# Patient Record
Sex: MALE | Race: WHITE | NOT HISPANIC OR LATINO | URBAN - METROPOLITAN AREA
[De-identification: names, ages, dates, MRNs, and addresses within clinical notes are randomized per-mention and may not be internally consistent; named-entity substitution may affect disease eponyms.]

---

## 2018-03-29 ENCOUNTER — INPATIENT (INPATIENT)
Facility: HOSPITAL | Age: 43
LOS: 2 days | Discharge: HOME | DRG: 444 | End: 2018-04-01
Attending: INTERNAL MEDICINE | Admitting: INTERNAL MEDICINE
Payer: MEDICAID

## 2018-03-29 VITALS
WEIGHT: 199.96 LBS | TEMPERATURE: 99 F | RESPIRATION RATE: 16 BRPM | OXYGEN SATURATION: 99 % | DIASTOLIC BLOOD PRESSURE: 91 MMHG | HEART RATE: 108 BPM | HEIGHT: 71 IN | SYSTOLIC BLOOD PRESSURE: 131 MMHG

## 2018-03-29 DIAGNOSIS — R10.9 UNSPECIFIED ABDOMINAL PAIN: ICD-10-CM

## 2018-03-29 DIAGNOSIS — K85.90 ACUTE PANCREATITIS WITHOUT NECROSIS OR INFECTION, UNSPECIFIED: ICD-10-CM

## 2018-03-29 DIAGNOSIS — K80.50 CALCULUS OF BILE DUCT WITHOUT CHOLANGITIS OR CHOLECYSTITIS WITHOUT OBSTRUCTION: ICD-10-CM

## 2018-03-29 DIAGNOSIS — Z29.9 ENCOUNTER FOR PROPHYLACTIC MEASURES, UNSPECIFIED: ICD-10-CM

## 2018-03-29 DIAGNOSIS — R11.2 NAUSEA WITH VOMITING, UNSPECIFIED: ICD-10-CM

## 2018-03-29 LAB
ALBUMIN SERPL ELPH-MCNC: 4 G/DL — SIGNIFICANT CHANGE UP (ref 3.3–5)
ALP SERPL-CCNC: 335 U/L — HIGH (ref 40–120)
ALT FLD-CCNC: 558 U/L — HIGH (ref 12–78)
AMYLASE P1 CFR SERPL: >1300 U/L — HIGH (ref 25–115)
ANION GAP SERPL CALC-SCNC: 11 MMOL/L — SIGNIFICANT CHANGE UP (ref 5–17)
APPEARANCE UR: CLEAR — SIGNIFICANT CHANGE UP
APTT BLD: 33.6 SEC — SIGNIFICANT CHANGE UP (ref 27.5–37.4)
AST SERPL-CCNC: 363 U/L — HIGH (ref 15–37)
BASOPHILS # BLD AUTO: 0.1 K/UL — SIGNIFICANT CHANGE UP (ref 0–0.2)
BASOPHILS NFR BLD AUTO: 0.8 % — SIGNIFICANT CHANGE UP (ref 0–2)
BILIRUB SERPL-MCNC: 9.1 MG/DL — HIGH (ref 0.2–1.2)
BILIRUB UR-MCNC: ABNORMAL
BUN SERPL-MCNC: 10 MG/DL — SIGNIFICANT CHANGE UP (ref 7–23)
CALCIUM SERPL-MCNC: 9.6 MG/DL — SIGNIFICANT CHANGE UP (ref 8.5–10.1)
CHLORIDE SERPL-SCNC: 97 MMOL/L — SIGNIFICANT CHANGE UP (ref 96–108)
CO2 SERPL-SCNC: 24 MMOL/L — SIGNIFICANT CHANGE UP (ref 22–31)
COLOR SPEC: ABNORMAL
CREAT SERPL-MCNC: 1.4 MG/DL — HIGH (ref 0.5–1.3)
DIFF PNL FLD: ABNORMAL
EOSINOPHIL # BLD AUTO: 0 K/UL — SIGNIFICANT CHANGE UP (ref 0–0.5)
EOSINOPHIL NFR BLD AUTO: 0.3 % — SIGNIFICANT CHANGE UP (ref 0–6)
GLUCOSE SERPL-MCNC: 175 MG/DL — HIGH (ref 70–99)
GLUCOSE UR QL: NEGATIVE — SIGNIFICANT CHANGE UP
HCT VFR BLD CALC: 50.3 % — HIGH (ref 39–50)
HGB BLD-MCNC: 16.1 G/DL — SIGNIFICANT CHANGE UP (ref 13–17)
INR BLD: 1.04 RATIO — SIGNIFICANT CHANGE UP (ref 0.88–1.16)
KETONES UR-MCNC: ABNORMAL
LACTATE SERPL-SCNC: 1.8 MMOL/L — SIGNIFICANT CHANGE UP (ref 0.7–2)
LEUKOCYTE ESTERASE UR-ACNC: ABNORMAL
LIDOCAIN IGE QN: SIGNIFICANT CHANGE UP U/L (ref 73–393)
LYMPHOCYTES # BLD AUTO: 0.8 K/UL — LOW (ref 1–3.3)
LYMPHOCYTES # BLD AUTO: 5 % — LOW (ref 13–44)
MCHC RBC-ENTMCNC: 29.2 PG — SIGNIFICANT CHANGE UP (ref 27–34)
MCHC RBC-ENTMCNC: 32 GM/DL — SIGNIFICANT CHANGE UP (ref 32–36)
MCV RBC AUTO: 91.3 FL — SIGNIFICANT CHANGE UP (ref 80–100)
MONOCYTES # BLD AUTO: 1.2 K/UL — HIGH (ref 0–0.9)
MONOCYTES NFR BLD AUTO: 6.9 % — SIGNIFICANT CHANGE UP (ref 1–9)
NEUTROPHILS # BLD AUTO: 14.7 K/UL — HIGH (ref 1.8–7.4)
NEUTROPHILS NFR BLD AUTO: 87 % — HIGH (ref 43–77)
NITRITE UR-MCNC: POSITIVE
PH UR: 5 — SIGNIFICANT CHANGE UP (ref 5–8)
PLATELET # BLD AUTO: 342 K/UL — SIGNIFICANT CHANGE UP (ref 150–400)
POTASSIUM SERPL-MCNC: 4.5 MMOL/L — SIGNIFICANT CHANGE UP (ref 3.5–5.3)
POTASSIUM SERPL-SCNC: 4.5 MMOL/L — SIGNIFICANT CHANGE UP (ref 3.5–5.3)
PROT SERPL-MCNC: 8.6 G/DL — HIGH (ref 6–8.3)
PROT UR-MCNC: 75 MG/DL
PROTHROM AB SERPL-ACNC: 11.4 SEC — SIGNIFICANT CHANGE UP (ref 9.8–12.7)
RBC # BLD: 5.51 M/UL — SIGNIFICANT CHANGE UP (ref 4.2–5.8)
RBC # FLD: 13.1 % — SIGNIFICANT CHANGE UP (ref 10.3–14.5)
SODIUM SERPL-SCNC: 132 MMOL/L — LOW (ref 135–145)
SP GR SPEC: 1.02 — SIGNIFICANT CHANGE UP (ref 1.01–1.02)
UROBILINOGEN FLD QL: 8
WBC # BLD: 16.9 K/UL — HIGH (ref 3.8–10.5)
WBC # FLD AUTO: 16.9 K/UL — HIGH (ref 3.8–10.5)

## 2018-03-29 PROCEDURE — 76705 ECHO EXAM OF ABDOMEN: CPT | Mod: 26

## 2018-03-29 PROCEDURE — 99223 1ST HOSP IP/OBS HIGH 75: CPT | Mod: GC

## 2018-03-29 PROCEDURE — 99285 EMERGENCY DEPT VISIT HI MDM: CPT

## 2018-03-29 PROCEDURE — 74177 CT ABD & PELVIS W/CONTRAST: CPT | Mod: 26

## 2018-03-29 RX ORDER — SODIUM CHLORIDE 9 MG/ML
1000 INJECTION INTRAMUSCULAR; INTRAVENOUS; SUBCUTANEOUS ONCE
Qty: 0 | Refills: 0 | Status: COMPLETED | OUTPATIENT
Start: 2018-03-29 | End: 2018-03-29

## 2018-03-29 RX ORDER — HYDROMORPHONE HYDROCHLORIDE 2 MG/ML
1 INJECTION INTRAMUSCULAR; INTRAVENOUS; SUBCUTANEOUS ONCE
Qty: 0 | Refills: 0 | Status: DISCONTINUED | OUTPATIENT
Start: 2018-03-29 | End: 2018-03-29

## 2018-03-29 RX ORDER — MORPHINE SULFATE 50 MG/1
4 CAPSULE, EXTENDED RELEASE ORAL ONCE
Qty: 0 | Refills: 0 | Status: DISCONTINUED | OUTPATIENT
Start: 2018-03-29 | End: 2018-03-29

## 2018-03-29 RX ORDER — SODIUM CHLORIDE 9 MG/ML
1000 INJECTION, SOLUTION INTRAVENOUS
Qty: 0 | Refills: 0 | Status: DISCONTINUED | OUTPATIENT
Start: 2018-03-29 | End: 2018-03-29

## 2018-03-29 RX ORDER — MORPHINE SULFATE 50 MG/1
4 CAPSULE, EXTENDED RELEASE ORAL EVERY 4 HOURS
Qty: 0 | Refills: 0 | Status: DISCONTINUED | OUTPATIENT
Start: 2018-03-29 | End: 2018-04-01

## 2018-03-29 RX ORDER — SODIUM CHLORIDE 9 MG/ML
1000 INJECTION INTRAMUSCULAR; INTRAVENOUS; SUBCUTANEOUS
Qty: 0 | Refills: 0 | Status: DISCONTINUED | OUTPATIENT
Start: 2018-03-29 | End: 2018-03-29

## 2018-03-29 RX ORDER — MORPHINE SULFATE 50 MG/1
2 CAPSULE, EXTENDED RELEASE ORAL EVERY 4 HOURS
Qty: 0 | Refills: 0 | Status: DISCONTINUED | OUTPATIENT
Start: 2018-03-29 | End: 2018-04-01

## 2018-03-29 RX ORDER — ONDANSETRON 8 MG/1
4 TABLET, FILM COATED ORAL EVERY 6 HOURS
Qty: 0 | Refills: 0 | Status: DISCONTINUED | OUTPATIENT
Start: 2018-03-29 | End: 2018-04-01

## 2018-03-29 RX ORDER — IOHEXOL 300 MG/ML
30 INJECTION, SOLUTION INTRAVENOUS ONCE
Qty: 0 | Refills: 0 | Status: COMPLETED | OUTPATIENT
Start: 2018-03-29 | End: 2018-03-29

## 2018-03-29 RX ORDER — ONDANSETRON 8 MG/1
4 TABLET, FILM COATED ORAL EVERY 6 HOURS
Qty: 0 | Refills: 0 | Status: DISCONTINUED | OUTPATIENT
Start: 2018-03-29 | End: 2018-03-29

## 2018-03-29 RX ORDER — HYDROMORPHONE HYDROCHLORIDE 2 MG/ML
1 INJECTION INTRAMUSCULAR; INTRAVENOUS; SUBCUTANEOUS EVERY 6 HOURS
Qty: 0 | Refills: 0 | Status: DISCONTINUED | OUTPATIENT
Start: 2018-03-29 | End: 2018-04-01

## 2018-03-29 RX ORDER — ACETAMINOPHEN 500 MG
1000 TABLET ORAL ONCE
Qty: 0 | Refills: 0 | Status: DISCONTINUED | OUTPATIENT
Start: 2018-03-29 | End: 2018-03-29

## 2018-03-29 RX ORDER — SODIUM CHLORIDE 9 MG/ML
1000 INJECTION, SOLUTION INTRAVENOUS
Qty: 0 | Refills: 0 | Status: DISCONTINUED | OUTPATIENT
Start: 2018-03-29 | End: 2018-03-30

## 2018-03-29 RX ORDER — PANTOPRAZOLE SODIUM 20 MG/1
40 TABLET, DELAYED RELEASE ORAL
Qty: 0 | Refills: 0 | Status: DISCONTINUED | OUTPATIENT
Start: 2018-03-29 | End: 2018-04-01

## 2018-03-29 RX ORDER — HYDROMORPHONE HYDROCHLORIDE 2 MG/ML
0.5 INJECTION INTRAMUSCULAR; INTRAVENOUS; SUBCUTANEOUS
Qty: 0 | Refills: 0 | Status: DISCONTINUED | OUTPATIENT
Start: 2018-03-29 | End: 2018-03-29

## 2018-03-29 RX ORDER — ONDANSETRON 8 MG/1
4 TABLET, FILM COATED ORAL ONCE
Qty: 0 | Refills: 0 | Status: COMPLETED | OUTPATIENT
Start: 2018-03-29 | End: 2018-03-29

## 2018-03-29 RX ORDER — PIPERACILLIN AND TAZOBACTAM 4; .5 G/20ML; G/20ML
3.38 INJECTION, POWDER, LYOPHILIZED, FOR SOLUTION INTRAVENOUS ONCE
Qty: 0 | Refills: 0 | Status: COMPLETED | OUTPATIENT
Start: 2018-03-29 | End: 2018-03-29

## 2018-03-29 RX ORDER — PIPERACILLIN AND TAZOBACTAM 4; .5 G/20ML; G/20ML
3.38 INJECTION, POWDER, LYOPHILIZED, FOR SOLUTION INTRAVENOUS EVERY 8 HOURS
Qty: 0 | Refills: 0 | Status: DISCONTINUED | OUTPATIENT
Start: 2018-03-29 | End: 2018-03-31

## 2018-03-29 RX ADMIN — ONDANSETRON 4 MILLIGRAM(S): 8 TABLET, FILM COATED ORAL at 13:14

## 2018-03-29 RX ADMIN — MORPHINE SULFATE 4 MILLIGRAM(S): 50 CAPSULE, EXTENDED RELEASE ORAL at 15:18

## 2018-03-29 RX ADMIN — SODIUM CHLORIDE 1000 MILLILITER(S): 9 INJECTION INTRAMUSCULAR; INTRAVENOUS; SUBCUTANEOUS at 13:17

## 2018-03-29 RX ADMIN — MORPHINE SULFATE 4 MILLIGRAM(S): 50 CAPSULE, EXTENDED RELEASE ORAL at 14:40

## 2018-03-29 RX ADMIN — PIPERACILLIN AND TAZOBACTAM 25 GRAM(S): 4; .5 INJECTION, POWDER, LYOPHILIZED, FOR SOLUTION INTRAVENOUS at 00:16

## 2018-03-29 RX ADMIN — SODIUM CHLORIDE 1000 MILLILITER(S): 9 INJECTION INTRAMUSCULAR; INTRAVENOUS; SUBCUTANEOUS at 14:55

## 2018-03-29 RX ADMIN — MORPHINE SULFATE 4 MILLIGRAM(S): 50 CAPSULE, EXTENDED RELEASE ORAL at 15:00

## 2018-03-29 RX ADMIN — MORPHINE SULFATE 4 MILLIGRAM(S): 50 CAPSULE, EXTENDED RELEASE ORAL at 18:45

## 2018-03-29 RX ADMIN — PANTOPRAZOLE SODIUM 40 MILLIGRAM(S): 20 TABLET, DELAYED RELEASE ORAL at 22:22

## 2018-03-29 RX ADMIN — PIPERACILLIN AND TAZOBACTAM 25 GRAM(S): 4; .5 INJECTION, POWDER, LYOPHILIZED, FOR SOLUTION INTRAVENOUS at 22:40

## 2018-03-29 RX ADMIN — SODIUM CHLORIDE 200 MILLILITER(S): 9 INJECTION, SOLUTION INTRAVENOUS at 22:15

## 2018-03-29 RX ADMIN — IOHEXOL 30 MILLILITER(S): 300 INJECTION, SOLUTION INTRAVENOUS at 13:15

## 2018-03-29 RX ADMIN — MORPHINE SULFATE 4 MILLIGRAM(S): 50 CAPSULE, EXTENDED RELEASE ORAL at 13:15

## 2018-03-29 RX ADMIN — PIPERACILLIN AND TAZOBACTAM 200 GRAM(S): 4; .5 INJECTION, POWDER, LYOPHILIZED, FOR SOLUTION INTRAVENOUS at 14:54

## 2018-03-29 RX ADMIN — SODIUM CHLORIDE 150 MILLILITER(S): 9 INJECTION INTRAMUSCULAR; INTRAVENOUS; SUBCUTANEOUS at 16:00

## 2018-03-29 NOTE — H&P ADULT - ASSESSMENT
42M with PMH GERD presents with 1 week of Left flank pain and intractable nausea, vomiting, admitted for biliary pancreatitis.

## 2018-03-29 NOTE — ED PROVIDER NOTE - PROGRESS NOTE DETAILS
call out to Dr Disla, awaiting call back,  call out to hospitalist,  Dr Whiting will call me back spoke with Dr Whiting, case discussed, will admit patient, spoke with Dr Disla, case discussed, will see patient, most likely ERCP today

## 2018-03-29 NOTE — ED PROVIDER NOTE - ATTENDING CONTRIBUTION TO CARE
I have personally performed a face to face diagnostic evaluation on this patient.  I have reviewed the PA note and agree with the history, exam, and plan of care, except as noted.  History and Exam by me shows  right left flank pain intermittently x 1 week, now exacerbated on the right flank.   no n/v or fever.  abd- soft, nt, no guarding or rebound.  no cva tenderness.  pt is jaundice . lab- elevated lft and bilitrubin. us shows choledocholithiasis .  Admit for ERCP

## 2018-03-29 NOTE — CONSULT NOTE ADULT - SUBJECTIVE AND OBJECTIVE BOX
Chief Complaint:  Patient is a 42y old  Male who presents with a chief complaint of     · HPI Objective Statement: 42 y male with a PMH significant for GERD presents with  left flank to mid back pain x 1 week, intermittent episodes of nausea with vomiting generalized abdominal pain.  denies change in bowel and bladder habits,  has noted his urine is dark, not sure if he had fever, decreased appetite.  last meal was last night.  non smoker, no etoh,  No PMD no hx of abdominal surgeries.	      Allergies:  No Known Allergies      Medications:      PMHX/PSHX:  No pertinent past medical history  No significant past surgical history      Family history:  nc    Social History:     ROS:     General:  No wt loss, fevers, chills, night sweats, fatigue,   Eyes:  Good vision, no reported pain  ENT:  No sore throat, pain, runny nose, dysphagia  CV:  No pain, palpitations, hypo/hypertension  Resp:  No dyspnea, cough, tachypnea, wheezing  GI:  + nausea, + vomiting, No pain, No nausea, No vomiting, No diarrhea, No constipation, No weight loss, No fever, No pruritis, No rectal bleeding, No tarry stools, No dysphagia,  :  + L flank pain, No pain, bleeding, incontinence, nocturia  Muscle:  No pain, weakness  Neuro:  No weakness, tingling, memory problems  Psych:  No fatigue, insomnia, mood problems, depression  Endocrine:  No polyuria, polydipsia, cold/heat intolerance  Heme:  No petechiae, ecchymosis, easy bruisability  Skin:  No rash, tattoos, scars, edema      PHYSICAL EXAM:   Vital Signs:  Vital Signs Last 24 Hrs  T(C): 36.8 (29 Mar 2018 13:20), Max: 37 (29 Mar 2018 12:36)  T(F): 98.2 (29 Mar 2018 13:20), Max: 98.6 (29 Mar 2018 12:36)  HR: 86 (29 Mar 2018 13:20) (86 - 108)  BP: 123/78 (29 Mar 2018 13:20) (123/78 - 131/91)  BP(mean): --  RR: 15 (29 Mar 2018 13:20) (15 - 16)  SpO2: 99% (29 Mar 2018 13:20) (99% - 99%)  Daily Height in cm: 180.34 (29 Mar 2018 12:36)    Daily     GENERAL:  Appears stated age, well-groomed, well-nourished, no distress  HEENT:  NC/AT,  conjunctivae clear and pink, no thyromegaly, nodules, adenopathy, no JVD, sclera -anicteric  CHEST:  Full & symmetric excursion, no increased effort, breath sounds clear  HEART:  Regular rhythm, S1, S2, no murmur/rub/S3/S4, no abdominal bruit, no edema  ABDOMEN:  Soft, non-tender, non-distended, normoactive bowel sounds,  no masses ,no hepato-splenomegaly, no signs of chronic liver disease  EXTREMITIES:  no cyanosis,clubbing or edema  SKIN:  + jaundice   NEURO:  Alert, oriented, no asterixis, no tremor, no encephalopathy    LABS:                        16.1   16.9  )-----------( 342      ( 29 Mar 2018 13:15 )             50.3         132<L>  |  97  |  10  ----------------------------<  175<H>  4.5   |  24  |  1.40<H>    Ca    9.6      29 Mar 2018 13:15    TPro  8.6<H>  /  Alb  4.0  /  TBili  9.1<H>  /  DBili  x   /  AST  363<H>  /  ALT  558<H>  /  AlkPhos  335<H>      LIVER FUNCTIONS - ( 29 Mar 2018 13:15 )  Alb: 4.0 g/dL / Pro: 8.6 g/dL / ALK PHOS: 335 U/L / ALT: 558 U/L / AST: 363 U/L / GGT: x           PT/INR - ( 29 Mar 2018 13:15 )   PT: 11.4 sec;   INR: 1.04 ratio         PTT - ( 29 Mar 2018 13:15 )  PTT:33.6 sec  Urinalysis Basic - ( 29 Mar 2018 13:15 )    Color: Josee / Appearance: Clear / S.020 / pH: x  Gluc: x / Ketone: Moderate  / Bili: Large / Urobili: 8   Blood: x / Protein: 75 mg/dL / Nitrite: Positive   Leuk Esterase: Small / RBC: 0-2 /HPF / WBC 0-2   Sq Epi: x / Non Sq Epi: x / Bacteria: Few      Amylase Serum>1300      Lipase [id number]       Ammonia--      Imaging:

## 2018-03-29 NOTE — CONSULT NOTE ADULT - ASSESSMENT
42 year old male presents with nausea and vomiting, L sided back pain, US gallbladder with possible choledocholithiasis

## 2018-03-29 NOTE — CONSULT NOTE ADULT - PROBLEM SELECTOR RECOMMENDATION 9
US gallbladder with likely choledocholithiasis  Bili 9.1, alk phos 335  pain control  start Zosyn  Plan for ERCP today, keep NPO

## 2018-03-29 NOTE — H&P ADULT - NSHPREVIEWOFSYSTEMS_GEN_ALL_CORE
Constitutional: (+) fever, chills, diaphoresis   HEENT: denies blurry vision, difficulty hearing  Respiratory: denies SOB, RASMUSSEN, cough, sputum production, wheezing, hemoptysis  Cardiovascular: denies CP, palpitations, edema  Gastrointestinal: (+) nausea, vomiting, abdominal pain; (-) diarrhea, constipation, melena, hematochezia   Genitourinary: denies dysuria, frequency, urgency, hematuria   Skin/Breast: (+) jaundice, denies itching  Musculoskeletal: denies myalgias, joint swelling, muscle weakness  Neurologic: denies headache, weakness, dizziness, paresthesias, numbness/tingling  Psychiatric: denies feeling anxious, depressed, suicidal, homicidal thoughts  Hematology/Oncology: denies bruising, tender or enlarged lymph nodes   ROS negative except as noted above

## 2018-03-29 NOTE — ED ADULT NURSE NOTE - OBJECTIVE STATEMENT
c/o rt flank pain/nausea x 6 days. c/o left flank pain radiating to rt /nausea x 6 days. pt face/eyes jaundice

## 2018-03-29 NOTE — H&P ADULT - NSHPPHYSICALEXAM_GEN_ALL_CORE
Physical Exam:  General: jaundiced, ill-appearing  HEENT: NCAT, EOMI bl, moist mucous membranes, (+) scleral icterus  Neck: Supple, nontender, no mass  Neurology: A&Ox3, nonfocal, CN II-XII grossly intact, sensation intact  Respiratory: CTA B/L, No W/R/R  CV: RRR, +S1/S2, no murmurs, rubs or gallops  Abdominal: Soft, TTP in URQ, ND +BSx4; (-) arita's sign, (-) rebound tenderness  Extremities: No C/C/E, + peripheral pulses  MSK: Normal ROM, no joint erythema or warmth, no joint swelling   Skin: jaundiced, warm, dry, no rash or abnormal lesions

## 2018-03-29 NOTE — H&P ADULT - ATTENDING COMMENTS
Agree with plan and exam as above with the following: Patient with no significant PMH with now presents with gallstone pancreatitis. Patient medically optimized for planned urgent ERCP. Dr. Disla aware. Rest of plan as above.

## 2018-03-29 NOTE — H&P ADULT - PROBLEM SELECTOR PLAN 1
-admit to general medicine floor  -obstructive pancreatitis 2/2 choledocholithiasis confirmed on CT  -plan for ERCP with Dr. Disla today  -zosyn, NPO, IVF, morphine/dilaudid for pain  -f/u GI recs post ERCP -admit to general medicine floor  -obstructive pancreatitis 2/2 choledocholithiasis confirmed on CT  -plan for ERCP with Dr. Disla today  -zosyn, NPO, IVF, morphine/dilaudid for pain  -f/u am labs, lipase, amylase  -f/u GI recs post ERCP  -monitor for resolution of jaundice

## 2018-03-29 NOTE — ED ADULT NURSE REASSESSMENT NOTE - NS ED NURSE REASSESS COMMENT FT1
gave 1/2 dose of 4mg morphine IV pt reported dizzy and relief from pain.   VSS no distress present
report to [place] WAGGON[facility].  Pt will transfer when OR is ready
spoke to Taylor in OR aware drank 1liter contrast at approx 0130 pm.  pt will transfer to floor then to OR

## 2018-03-29 NOTE — ED PROVIDER NOTE - OBJECTIVE STATEMENT
42 y male presents with  left flank to mid back pain x 1 week, intermittent episodes of nausea with vomiting generalized abdominal pain.  denies change in bowel and bladder habits,  has noted his urine is dark, not sure if he had fever, decreased appetite.  last meal was last night.  non smoker, no etoh,  No PMD  no hx of abdominal surgeries.

## 2018-03-29 NOTE — H&P ADULT - HISTORY OF PRESENT ILLNESS
42M with PMH GERD presents with 1 week of Left flank pain.  Two days prior to admission, patient states his urine became the color of caramel and he noticed himself becoming jaundiced.  On the day of admission, patients states the pain became extremely severe, accompanied by intractable nausea, vomiting. 42M with PMH GERD presents with 1 week of Left flank pain.  Two days prior to admission, patient states his urine became the color of caramel and he noticed himself becoming jaundiced.  On the day of admission, patients states the pain became extremely severe, accompanied by intractable nausea, vomiting.  Pain is located in left flank area, radiates towards groin.  Accompanied by URQ pain.  ROS: (+) fevers, chills, nausea, vomiting, abdominal pain, difficulty taking a deep breath 2/2 pain. (-) chest pain/palpitations,     ED Course:  Vitals on presentation: T98.6F oral, , /91, RR 16, SpO2 99% on RA.  Labs significant for WBC 16.9 w/ left shift, Na 132, Cr 1.4, T bili 9.1, Alk Phos 335, , , lipase [id number]. UA: mendez urine, large bili, (+) nitrite, LE, few bacteria.  US GB: Cholelithiasis with mild biliary dilatation suggestive of possible choledocholithiasis. 42M with PMH GERD presents with 1 week of Left flank pain.  Two days prior to admission, patient states his urine became the color of caramel and he noticed himself becoming jaundiced.  On the day of admission, patients states the pain became extremely severe, accompanied by intractable nausea, vomiting.  Pain is located in left flank area, radiates towards groin.  Accompanied by URQ pain.  ROS: (+) fevers, chills, nausea, vomiting, abdominal pain, difficulty taking a deep breath 2/2 pain. (-) chest pain/palpitations,     ED Course:  Vitals on presentation: T98.6F oral, , /91, RR 16, SpO2 99% on RA.  Labs significant for WBC 16.9 w/ left shift, Na 132, Cr 1.4, T bili 9.1, Alk Phos 335, , , lipase [id number]. UA: mendez urine, large bili, (+) nitrite, LE, few bacteria.  US GB: Cholelithiasis with mild biliary dilatation suggestive of possible choledocholithiasis.  CT A/P: CT findings compatible with acute interstitial pancreatitis with hydropic gallbladder and intra and extrahepatic biliary ductal dilatation; findings suggestive of biliary pancreatitis.  Patient received morphine, zofran, zosyn, NS 1L bolus x2.

## 2018-03-29 NOTE — H&P ADULT - PROBLEM SELECTOR PLAN 3
DVT PPx: SCDs in setting of procedure today  IMPROVE VTE Individual Risk Assessment          RISK       Points    [  ] Previous VTE 3  [  ] Thrombophilia  2  [  ] Lower limb paralysis  2        (unable to hold up >15 seconds)    [  ] Current Cancer    2         (within 6 months)  [  ] Immobilization > 24 hrs 1  [  ] ICU/CCU stay > 24 hours   1  [  ] Age > 60  1  IMPROVE VTE Score: 0

## 2018-03-30 DIAGNOSIS — K85.90 ACUTE PANCREATITIS WITHOUT NECROSIS OR INFECTION, UNSPECIFIED: ICD-10-CM

## 2018-03-30 DIAGNOSIS — R74.0 NONSPECIFIC ELEVATION OF LEVELS OF TRANSAMINASE AND LACTIC ACID DEHYDROGENASE [LDH]: ICD-10-CM

## 2018-03-30 LAB
ALBUMIN SERPL ELPH-MCNC: 2.7 G/DL — LOW (ref 3.3–5)
ALP SERPL-CCNC: 287 U/L — HIGH (ref 40–120)
ALT FLD-CCNC: 460 U/L — HIGH (ref 12–78)
AMYLASE P1 CFR SERPL: 1162 U/L — HIGH (ref 25–115)
ANION GAP SERPL CALC-SCNC: 9 MMOL/L — SIGNIFICANT CHANGE UP (ref 5–17)
AST SERPL-CCNC: 329 U/L — HIGH (ref 15–37)
BASOPHILS # BLD AUTO: 0.1 K/UL — SIGNIFICANT CHANGE UP (ref 0–0.2)
BASOPHILS NFR BLD AUTO: 0.5 % — SIGNIFICANT CHANGE UP (ref 0–2)
BILIRUB SERPL-MCNC: 5.1 MG/DL — HIGH (ref 0.2–1.2)
BUN SERPL-MCNC: 10 MG/DL — SIGNIFICANT CHANGE UP (ref 7–23)
CALCIUM SERPL-MCNC: 8.2 MG/DL — LOW (ref 8.5–10.1)
CHLORIDE SERPL-SCNC: 101 MMOL/L — SIGNIFICANT CHANGE UP (ref 96–108)
CO2 SERPL-SCNC: 25 MMOL/L — SIGNIFICANT CHANGE UP (ref 22–31)
CREAT SERPL-MCNC: 0.98 MG/DL — SIGNIFICANT CHANGE UP (ref 0.5–1.3)
EOSINOPHIL # BLD AUTO: 0.1 K/UL — SIGNIFICANT CHANGE UP (ref 0–0.5)
EOSINOPHIL NFR BLD AUTO: 0.7 % — SIGNIFICANT CHANGE UP (ref 0–6)
GLUCOSE SERPL-MCNC: 103 MG/DL — HIGH (ref 70–99)
HCT VFR BLD CALC: 41.3 % — SIGNIFICANT CHANGE UP (ref 39–50)
HGB BLD-MCNC: 13.4 G/DL — SIGNIFICANT CHANGE UP (ref 13–17)
LIDOCAIN IGE QN: HIGH U/L (ref 73–393)
LYMPHOCYTES # BLD AUTO: 1 K/UL — SIGNIFICANT CHANGE UP (ref 1–3.3)
LYMPHOCYTES # BLD AUTO: 8 % — LOW (ref 13–44)
MCHC RBC-ENTMCNC: 29.5 PG — SIGNIFICANT CHANGE UP (ref 27–34)
MCHC RBC-ENTMCNC: 32.4 GM/DL — SIGNIFICANT CHANGE UP (ref 32–36)
MCV RBC AUTO: 91 FL — SIGNIFICANT CHANGE UP (ref 80–100)
MONOCYTES # BLD AUTO: 1.2 K/UL — HIGH (ref 0–0.9)
MONOCYTES NFR BLD AUTO: 9.5 % — HIGH (ref 1–9)
NEUTROPHILS # BLD AUTO: 10.4 K/UL — HIGH (ref 1.8–7.4)
NEUTROPHILS NFR BLD AUTO: 81.3 % — HIGH (ref 43–77)
PLATELET # BLD AUTO: 280 K/UL — SIGNIFICANT CHANGE UP (ref 150–400)
POTASSIUM SERPL-MCNC: 3.9 MMOL/L — SIGNIFICANT CHANGE UP (ref 3.5–5.3)
POTASSIUM SERPL-SCNC: 3.9 MMOL/L — SIGNIFICANT CHANGE UP (ref 3.5–5.3)
PROT SERPL-MCNC: 6.2 G/DL — SIGNIFICANT CHANGE UP (ref 6–8.3)
RBC # BLD: 4.54 M/UL — SIGNIFICANT CHANGE UP (ref 4.2–5.8)
RBC # FLD: 13.5 % — SIGNIFICANT CHANGE UP (ref 10.3–14.5)
SODIUM SERPL-SCNC: 135 MMOL/L — SIGNIFICANT CHANGE UP (ref 135–145)
WBC # BLD: 12.8 K/UL — HIGH (ref 3.8–10.5)
WBC # FLD AUTO: 12.8 K/UL — HIGH (ref 3.8–10.5)

## 2018-03-30 PROCEDURE — 99233 SBSQ HOSP IP/OBS HIGH 50: CPT | Mod: GC

## 2018-03-30 RX ORDER — SODIUM CHLORIDE 9 MG/ML
1000 INJECTION, SOLUTION INTRAVENOUS
Qty: 0 | Refills: 0 | Status: DISCONTINUED | OUTPATIENT
Start: 2018-03-30 | End: 2018-04-01

## 2018-03-30 RX ADMIN — PIPERACILLIN AND TAZOBACTAM 25 GRAM(S): 4; .5 INJECTION, POWDER, LYOPHILIZED, FOR SOLUTION INTRAVENOUS at 13:26

## 2018-03-30 RX ADMIN — PANTOPRAZOLE SODIUM 40 MILLIGRAM(S): 20 TABLET, DELAYED RELEASE ORAL at 17:29

## 2018-03-30 RX ADMIN — SODIUM CHLORIDE 150 MILLILITER(S): 9 INJECTION, SOLUTION INTRAVENOUS at 16:31

## 2018-03-30 RX ADMIN — MORPHINE SULFATE 4 MILLIGRAM(S): 50 CAPSULE, EXTENDED RELEASE ORAL at 05:31

## 2018-03-30 RX ADMIN — SODIUM CHLORIDE 200 MILLILITER(S): 9 INJECTION, SOLUTION INTRAVENOUS at 06:14

## 2018-03-30 RX ADMIN — SODIUM CHLORIDE 200 MILLILITER(S): 9 INJECTION, SOLUTION INTRAVENOUS at 13:27

## 2018-03-30 RX ADMIN — PIPERACILLIN AND TAZOBACTAM 25 GRAM(S): 4; .5 INJECTION, POWDER, LYOPHILIZED, FOR SOLUTION INTRAVENOUS at 06:06

## 2018-03-30 RX ADMIN — PIPERACILLIN AND TAZOBACTAM 25 GRAM(S): 4; .5 INJECTION, POWDER, LYOPHILIZED, FOR SOLUTION INTRAVENOUS at 23:23

## 2018-03-30 RX ADMIN — PANTOPRAZOLE SODIUM 40 MILLIGRAM(S): 20 TABLET, DELAYED RELEASE ORAL at 06:06

## 2018-03-30 RX ADMIN — MORPHINE SULFATE 4 MILLIGRAM(S): 50 CAPSULE, EXTENDED RELEASE ORAL at 05:46

## 2018-03-30 NOTE — DISCHARGE NOTE ADULT - HOSPITAL COURSE
42M with PMH GERD presents with 1 week of Left flank pain.  Two days prior to admission, patient states his urine became the color of caramel and he noticed himself becoming jaundiced.  On the day of admission, patients states the pain became extremely severe, accompanied by intractable nausea, vomiting.  Pain is located in left flank area, radiates towards groin.  Accompanied by URQ pain.  ROS: (+) fevers, chills, nausea, vomiting, abdominal pain, difficulty taking a deep breath 2/2 pain. (-) chest pain/palpitations,   Labs significant for WBC 16.9 w/ left shift, Na 132, Cr 1.4, T bili 9.1, Alk Phos 335, , , lipase [id number]. UA: mendez urine, large bili, (+) nitrite, LE, few bacteria.  US GB: Cholelithiasis with mild biliary dilatation suggestive of possible choledocholithiasis.  CT A/P: CT findings compatible with acute interstitial pancreatitis with hydropic gallbladder and intra and extrahepatic biliary ductal dilatation; findings suggestive of biliary pancreatitis.  Patient received morphine, zofran, zosyn, NS 1L bolus x2.      Underwent ERCP with stone removal and stent placement with Dr. Disla- will need removal in about 8 weeks. Evaluated by surgery Dr. Obrien for lap luann- patient and patient's father refused because he is too weak at this time- will follow outpatient. During patient's stay he continued to receive IVF with lactated ringers, and IV abx. Pain control with morphine/dilaudid. Diet was advanced as tolerated by patient. Liver, pancreatic, gallbladder function continued to improve during his stay. WBC trended down. 42M with PMH GERD presents with 1 week of Left flank pain.  Two days prior to admission, patient states his urine became the color of caramel and he noticed himself becoming jaundiced.  On the day of admission, patients states the pain became extremely severe, accompanied by intractable nausea, vomiting.  Pain is located in left flank area, radiates towards groin.  Accompanied by URQ pain.  ROS: (+) fevers, chills, nausea, vomiting, abdominal pain, difficulty taking a deep breath 2/2 pain. (-) chest pain/palpitations,   Labs significant for WBC 16.9 w/ left shift, Na 132, Cr 1.4, T bili 9.1, Alk Phos 335, , , lipase 09920. UA: mendez urine, large bili, (+) nitrite, LE, few bacteria.  US GB: Cholelithiasis with mild biliary dilatation suggestive of possible choledocholithiasis.  CT A/P: CT findings compatible with acute interstitial pancreatitis with hydropic gallbladder and intra and extrahepatic biliary ductal dilatation; findings suggestive of biliary pancreatitis.  Patient received morphine, zofran, zosyn, NS 1L bolus x2.  admitted with acute  Pancreatitis due to biliary obstruction. npo , iv fluid , iv abx   -obstructive pancreatitis 2/2 choledocholithiasis confirmed on CT  -plan for ERCP with gi  Dr. Weber ,       Underwent ERCP with stone removal and stent placement with Dr. Weber  tolerated procedure well - will need removal of stent  in about 8 weeks. Evaluated by surgery Dr. Obrien for lap luann- patient and patient's father refused because he is too weak at this time- will follow outpatient. notice to have  Transaminitis.  -Likely due to obstructing stone in CBD  -s/p ERCP yesterday for CBD stone removal and stent placement  - Alk phos trending down, 287 from 335, LFT's trending down. Will continue to monitor out pt .   - continue to advance diet- currently clear liq pt tolerating, During patient's stay he continued to receive IVF with lactated ringers, and IV abx. Pain control with morphine/dilaudid. Diet was advanced as tolerated by patient low fat diet . dc iv abx as per gi no need , dc iv fluid , blood cult neg , no fever , ucult neg , leucocytosis  better sec to acute pancreatitis  . pt clear by gi and surgery to be dc home  and fu in office . pt received all dc instruction. physical exam 4-1-18  day of dc  Vital Signs Last 24 Hrs  T(C): 36.8 (01 Apr 2018 05:27), Max: 37.3 (31 Mar 2018 14:53)  T(F): 98.2 (01 Apr 2018 05:27), Max: 99.2 (31 Mar 2018 14:53)  HR: 93 (01 Apr 2018 05:27) (89 - 98)  BP: 111/70 (01 Apr 2018 05:27) (111/70 - 123/80)  BP(mean): --  RR: 16 (01 Apr 2018 05:27) (16 - 17)  SpO2: 96% (01 Apr 2018 05:27) (96% - 100%) GEN no distress , HEENT nt/nc , perrla , CVS s1s2 no tachy , CHEST bl air entery  present  , CNS aao/3 , GI soft , bs present  , no tenderness ,  intact , EXT no edema, pedal pulse present , SKIN no rash.  fu gi dr weber in 1to 2 wk . fu dr rios man surgery with in 1wk.

## 2018-03-30 NOTE — DISCHARGE NOTE ADULT - CARE PROVIDER_API CALL
Froylan Disla (), Gastroenterology; Internal Medicine  [address] [place] [address]  Phone: [phone]  Fax: [phone]    Clif Yin), ColonRectal Surgery; Surgery  [address] [place] [address]  Phone: [phone]  Fax: [phone]

## 2018-03-30 NOTE — PROGRESS NOTE ADULT - SUBJECTIVE AND OBJECTIVE BOX
HPI:  42M with PMH GERD presents with 1 week of Left flank pain. Two days prior to admission, patient states his urine became the color of caramel and he noticed himself becoming jaundiced.  On the day of admission, patients states the pain became extremely severe, accompanied by intractable nausea, vomiting.  Pain is located in left flank area, radiates towards groin.  Accompanied by URQ pain.  ROS: (+) fevers, chills, nausea, vomiting, abdominal pain, difficulty taking a deep breath 2/2 pain. (-) chest pain/palpitations,     Labs on presentation were significant for WBC 16.9 w/ left shift, T bili 9.1, Alk Phos 335, , , lipase [id number]. US GB: Cholelithiasis with mild biliary dilatation suggestive of possible choledocholithiasis.  CT A/P: CT findings compatible with acute interstitial pancreatitis with hydropic gallbladder and intra and extrahepatic biliary ductal dilatation; findings suggestive of biliary pancreatitis.     Interval HPI/ Overnight events:  Patient is s/p ERCP yesterday where a CBD stone was removed and a stent was placed. Patient complains of tenderness but is complaining some shortness of breath secondary to abdominal pain. Patient remains NPO and on IV antibiotics.     REVIEW OF SYSTEMS:    CONSTITUTIONAL: No weakness, fevers or chills  EYES/ENT: No visual changes, no throat pain   RESPIRATORY: +pain with inspiration  CARDIOVASCULAR: No chest pain or palpitations  GASTROINTESTINAL: + abdominal tenderness, no nausea, vomiting, or hematemesis; No diarrhea or constipation. No melena or hematochezia.  GENITOURINARY: No dysuria, frequency or hematuria  NEUROLOGICAL: No dizziness, numbness, or weakness  SKIN: No itching, burning, rashes, or lesions   All other review of systems is negative unless indicated above.    VITAL SIGNS:    T: 99.4 HR: 89  BP: 124/81  RR:17  SpO2: 97    PHYSICAL EXAM:     GENERAL: no acute distress  HEENT: + very mild jaunidice, NC/AT, EOMI, neck supple, MMM  RESPIRATORY: LCTAB/L, no rhonchi, rales, or wheezing  CARDIOVASCULAR: RRR, no murmurs, gallops, rubs  ABDOMINAL: +RUQ tenderness to palpation, + mild guarding on RUQ palpation, + distention, positive bowel sounds   EXTREMITIES: no clubbing, cyanosis, or edema  NEUROLOGICAL: alert and oriented x 3, non-focal  SKIN: no rashes or lesions   MUSCULOSKELETAL: no gross joint deformity                          13.4   12.8  )-----------( 280      ( 30 Mar 2018 08:17 )             41.3     03-30    135  |  101  |  10  ----------------------------<  103<H>  3.9   |  25  |  0.98    Ca    8.2<L>      30 Mar 2018 07:04    TPro  6.2  /  Alb  2.7<L>  /  TBili  5.1<H>  /  DBili  x   /  AST  329<H>  /  ALT  460<H>  /  AlkPhos  287<H>  03-30      CAPILLARY BLOOD GLUCOSE          MEDICATIONS  (STANDING):  lactated ringers. 1000 milliLiter(s) (200 mL/Hr) IV Continuous <Continuous>  pantoprazole  Injectable 40 milliGRAM(s) IV Push two times a day  piperacillin/tazobactam IVPB. 3.375 Gram(s) IV Intermittent every 8 hours HPI:  42M with PMH GERD presents with 1 week of Left flank pain. Two days prior to admission, patient states his urine became the color of caramel and he noticed himself becoming jaundiced.  On the day of admission, patients states the pain became extremely severe, accompanied by intractable nausea, vomiting.  Pain is located in left flank area, radiates towards groin.  Accompanied by URQ pain.  ROS: (+) fevers, chills, nausea, vomiting, abdominal pain, difficulty taking a deep breath 2/2 pain. (-) chest pain/palpitations,     Labs on presentation were significant for WBC 16.9 w/ left shift, T bili 9.1, Alk Phos 335, , , lipase [id number]. US GB: Cholelithiasis with mild biliary dilatation suggestive of possible choledocholithiasis.  CT A/P: CT findings compatible with acute interstitial pancreatitis with hydropic gallbladder and intra and extrahepatic biliary ductal dilatation; findings suggestive of biliary pancreatitis.     Interval HPI/ Overnight events:  Patient is s/p ERCP yesterday where a CBD stone was removed and a stent was placed. Patient complains of tenderness but is complaining some shortness of breath secondary to abdominal pain. Diet advanced to clear liquid as per GI today, on IVF and IV antibiotics.     REVIEW OF SYSTEMS:    CONSTITUTIONAL: No weakness, fevers or chills  EYES/ENT: No visual changes, no throat pain   RESPIRATORY: +pain with inspiration  CARDIOVASCULAR: No chest pain or palpitations  GASTROINTESTINAL: + abdominal tenderness, no nausea, vomiting, or hematemesis; No diarrhea or constipation. No melena or hematochezia.  GENITOURINARY: No dysuria, frequency or hematuria  NEUROLOGICAL: No dizziness, numbness, or weakness  SKIN: No itching, burning, rashes, or lesions   All other review of systems is negative unless indicated above.    VITAL SIGNS:    T: 99.4 HR: 89  BP: 124/81  RR:17  SpO2: 97    PHYSICAL EXAM:     GENERAL: no acute distress  HEENT: +mildly icteric, NC/AT, EOMI, neck supple, MMM  RESPIRATORY: LCTAB/L, no rhonchi, rales, or wheezing  CARDIOVASCULAR: RRR, no murmurs, gallops, rubs  ABDOMINAL: +RUQ tenderness to palpation, + mild guarding on RUQ palpation, + distention, positive bowel sounds   EXTREMITIES: no clubbing, cyanosis, or edema  NEUROLOGICAL: alert and oriented x 3, non-focal  SKIN: no rashes or lesions   MUSCULOSKELETAL: no gross joint deformity                          13.4   12.8  )-----------( 280      ( 30 Mar 2018 08:17 )             41.3     03-30    135  |  101  |  10  ----------------------------<  103<H>  3.9   |  25  |  0.98    Ca    8.2<L>      30 Mar 2018 07:04    TPro  6.2  /  Alb  2.7<L>  /  TBili  5.1<H>  /  DBili  x   /  AST  329<H>  /  ALT  460<H>  /  AlkPhos  287<H>  03-30      CAPILLARY BLOOD GLUCOSE          MEDICATIONS  (STANDING):  lactated ringers. 1000 milliLiter(s) (200 mL/Hr) IV Continuous <Continuous>  pantoprazole  Injectable 40 milliGRAM(s) IV Push two times a day  piperacillin/tazobactam IVPB. 3.375 Gram(s) IV Intermittent every 8 hours

## 2018-03-30 NOTE — PROGRESS NOTE ADULT - SUBJECTIVE AND OBJECTIVE BOX
Interval Events: s/p ERCP with stone removal and stent placement. Pt complains of abdominal soreness.    · HPI Objective Statement: 42 y male with a PMH significant for GERD presents with  left flank to mid back pain x 1 week, intermittent episodes of nausea with vomiting generalized abdominal pain.  denies change in bowel and bladder habits,  has noted his urine is dark, not sure if he had fever, decreased appetite.  last meal was last night.  non smoker, no etoh,  No PMD no hx of abdominal surgeries.	      MEDICATIONS  (STANDING):  lactated ringers. 1000 milliLiter(s) (200 mL/Hr) IV Continuous <Continuous>  pantoprazole  Injectable 40 milliGRAM(s) IV Push two times a day  piperacillin/tazobactam IVPB. 3.375 Gram(s) IV Intermittent every 8 hours    MEDICATIONS  (PRN):  HYDROmorphone  Injectable 1 milliGRAM(s) IV Push every 6 hours PRN Severe Pain (7 - 10)  morphine  - Injectable 4 milliGRAM(s) IV Push every 4 hours PRN Moderate Pain (4 - 6)  morphine  - Injectable 2 milliGRAM(s) IV Push every 4 hours PRN Mild Pain (1 - 3)  ondansetron Injectable 4 milliGRAM(s) IV Push every 6 hours PRN Nausea and/or Vomiting      Allergies    No Known Allergies    Intolerances        Review of Systems:    General:  No wt loss, fevers, chills, night sweats,fatigue,   Eyes:  Good vision, no reported pain  ENT:  No sore throat, pain, runny nose, dysphagia  CV:  No pain, palpitations, hypo/hypertension  Resp:  No dyspnea, cough, tachypnea, wheezing  GI:  No pain, No nausea, No vomiting, No diarrhea, No constipation, No weight loss, No fever, No pruritis, No rectal bleeding, No melena, No dysphagia  :  No pain, bleeding, incontinence, nocturia  Muscle:  No pain, weakness  Neuro:  No weakness, tingling, memory problems  Psych:  No fatigue, insomnia, mood problems, depression  Endocrine:  No polyuria, polydypsia, cold/heat intolerance  Heme:  No petechiae, ecchymosis, easy bruisability  Skin:  No rash, tattoos, scars, edema      Vital Signs Last 24 Hrs  T(C): 37.4 (30 Mar 2018 05:34), Max: 37.5 (29 Mar 2018 22:35)  T(F): 99.4 (30 Mar 2018 05:34), Max: 99.5 (29 Mar 2018 22:35)  HR: 89 (30 Mar 2018 05:34) (66 - 108)  BP: 124/81 (30 Mar 2018 05:34) (109/58 - 131/91)  BP(mean): --  RR: 17 (30 Mar 2018 05:34) (14 - 17)  SpO2: 97% (30 Mar 2018 05:34) (97% - 100%)    PHYSICAL EXAM:    Constitutional: NAD, well-developed  HEENT: EOMI, throat clear  Neck: No LAD, supple  Respiratory: CTA and P  Cardiovascular: S1 and S2, RRR, no M  Gastrointestinal: BS+, soft, NT/ND, neg HSM,  Extremities: No peripheral edema, neg clubing, cyanosis  Vascular: 2+ peripheral pulses  Neurological: A/O x 3, no focal deficits  Psychiatric: Normal mood, normal affect  Skin: No rashes      LABS:                        13.4   12.8  )-----------( 280      ( 30 Mar 2018 08:17 )             41.3     03-30    135  |  101  |  10  ----------------------------<  103<H>  3.9   |  25  |  0.98    Ca    8.2<L>      30 Mar 2018 07:04    TPro  6.2  /  Alb  2.7<L>  /  TBili  5.1<H>  /  DBili  x   /  AST  329<H>  /  ALT  460<H>  /  AlkPhos  287<H>  0330    PT/INR - ( 29 Mar 2018 13:15 )   PT: 11.4 sec;   INR: 1.04 ratio         PTT - ( 29 Mar 2018 13:15 )  PTT:33.6 sec  Urinalysis Basic - ( 29 Mar 2018 13:15 )    Color: Josee / Appearance: Clear / S.020 / pH: x  Gluc: x / Ketone: Moderate  / Bili: Large / Urobili: 8   Blood: x / Protein: 75 mg/dL / Nitrite: Positive   Leuk Esterase: Small / RBC: 0-2 /HPF / WBC 0-2   Sq Epi: x / Non Sq Epi: x / Bacteria: Few        RADIOLOGY & ADDITIONAL TESTS:

## 2018-03-30 NOTE — DISCHARGE NOTE ADULT - PLAN OF CARE
Resolution This was due to a stone found in your common bile duct, leading to inflammation of several organs including your pancreas, liver, gallbladder. The stone was removed and a stent placed however it is important for you to follow up with surgeon Dr. Obrien within 1 week after discharge as it is likely for a similar attack to occur. Please follow up with Gastroenterologist Dr. Disla to discuss removal of the stent in 1 week after discharge. Continue to stay hydrated, if you note persistent fevers, recurring or worsening abdominal pain please notify your PMD or return to the hospital. Resolution sec to acute pancreatitis gall stone This was due to a stone found in your common bile duct, leading to inflammation of  organs your pancreas The stone was removed and a stent placed however it is important for you to follow up with surgeon Dr. Obrien within 1 week after discharge as it is likely for a similar attack to occur. Please follow up with Gastroenterologist Dr. Disla to discuss removal of the stent in 1 week after discharge. Continue to stay hydrated, if you note persistent fevers, recurring or worsening abdominal pain please notify your PMD or return to the hospital.

## 2018-03-30 NOTE — DISCHARGE NOTE ADULT - PATIENT PORTAL LINK FT
You can access the [facility] Patient Portal, offered by [facility], by registering with the following website: [email or web address]

## 2018-03-30 NOTE — PROGRESS NOTE ADULT - PROBLEM SELECTOR PLAN 2
-s/p ERCP yesterday for CBD stone removal and stent placement. GI follow up for stent removal in 8 weeks.  - Alk phos trending down, 287 from 335, LFT's trending down. Will continue to monitor   - continue NPO, IVF, IV antibiotics   - continue morphine/ Dilaudid for pain and Zofran for nausea  - surgery consult -s/p ERCP yesterday for CBD stone removal and stent placement. GI follow up for stent removal in 8 weeks.  - Alk phos trending down, 287 from 335, LFT's trending down. Will continue to monitor   - continue to advance diet- currently clear liq pt tolerating, IVF, IV antibiotics   - continue morphine/ Dilaudid for pain and Zofran for nausea  - surgery consulted- Dr. Obrien

## 2018-03-30 NOTE — DISCHARGE NOTE ADULT - CARE PLAN
Principal Discharge DX:	Abdominal pain  Goal:	Resolution  Assessment and plan of treatment:	This was due to a stone found in your common bile duct, leading to inflammation of several organs including your pancreas, liver, gallbladder. The stone was removed and a stent placed however it is important for you to follow up with surgeon Dr. Obrien within 1 week after discharge as it is likely for a similar attack to occur. Please follow up with Gastroenterologist Dr. Disla to discuss removal of the stent in 1 week after discharge. Continue to stay hydrated, if you note persistent fevers, recurring or worsening abdominal pain please notify your PMD or return to the hospital. Principal Discharge DX:	Abdominal pain  Goal:	Resolution sec to acute pancreatitis gall stone  Assessment and plan of treatment:	This was due to a stone found in your common bile duct, leading to inflammation of  organs your pancreas The stone was removed and a stent placed however it is important for you to follow up with surgeon Dr. Obrien within 1 week after discharge as it is likely for a similar attack to occur. Please follow up with Gastroenterologist Dr. Disla to discuss removal of the stent in 1 week after discharge. Continue to stay hydrated, if you note persistent fevers, recurring or worsening abdominal pain please notify your PMD or return to the hospital.

## 2018-03-30 NOTE — PROGRESS NOTE ADULT - PROBLEM SELECTOR PLAN 3
DVT PPx: SCD   IMPROVE VTE Individual Risk Assessment          RISK       Points    [  ] Previous VTE 3  [  ] Thrombophilia  2  [  ] Lower limb paralysis  2        (unable to hold up >15 seconds)    [  ] Current Cancer    2         (within 6 months)  [ x ] Immobilization > 24 hrs 1  [  ] ICU/CCU stay > 24 hours   1  [  ] Age > 60  1  IMPROVE VTE Score: 0 -Likely due to obstructing stone in CBD  -s/p ERCP yesterday for CBD stone removal and stent placement  - Alk phos trending down, 287 from 335, LFT's trending down. Will continue to monitor   - continue to advance diet- currently clear liq pt tolerating, IVF, IV antibiotics

## 2018-03-30 NOTE — DISCHARGE NOTE ADULT - ADDITIONAL INSTRUCTIONS
FU GI DR Disla  IN 1 TO 2 WK / pt is post  ERCP with stone removal and  POST stent placement with Dr. Disla- will need removal in about 8 weeks . fu lft and lipase , cbc and electrolyte  repeat in 3 to 4days  . low fat diet .   fu with in 1wk surgery Clif Keane), ColonRectal Surgery; Surgery  [address] [place] [address]  Phone: [phone]  Fax: [phone]/ pt will need lap cholecystectomy as risk of recurrent gall stone pancreatitis present in future . fu pcp  in 1wk / find with your insurance .

## 2018-03-30 NOTE — CONSULT NOTE ADULT - SUBJECTIVE AND OBJECTIVE BOX
43 yo male with CBD stone, underwent ERCP with stone removal/stent placement, refers abdominal pain has improved. Denies any previous gallbladder attacks. afebrile    HPI:  42M with PMH GERD presents with 1 week of Left flank pain.  Two days prior to admission, patient states his urine became the color of caramel and he noticed himself becoming jaundiced.  On the day of admission, patients states the pain became extremely severe, accompanied by intractable nausea, vomiting.  Pain is located in left flank area, radiates towards groin.  Accompanied by URQ pain.  ROS: (+) fevers, chills, nausea, vomiting, abdominal pain, difficulty taking a deep breath 2/2 pain. (-) chest pain/palpitations,     ED Course:  Vitals on presentation: T98.6F oral, , /91, RR 16, SpO2 99% on RA.  Labs significant for WBC 16.9 w/ left shift, Na 132, Cr 1.4, T bili 9.1, Alk Phos 335, , , lipase 13865. UA: mendez urine, large bili, (+) nitrite, LE, few bacteria.  US GB: Cholelithiasis with mild biliary dilatation suggestive of possible choledocholithiasis.  CT A/P: CT findings compatible with acute interstitial pancreatitis with hydropic gallbladder and intra and extrahepatic biliary ductal dilatation; findings suggestive of biliary pancreatitis.  Patient received morphine, zofran, zosyn, NS 1L bolus x2. (29 Mar 2018 15:30)      PAST MEDICAL & SURGICAL HISTORY:  GERD (gastroesophageal reflux disease)  Asthma  No significant past surgical history      REVIEW OF SYSTEMS:    CONSTITUTIONAL: No weakness, fevers or chills  EYES/ENT: No visual changes;  No vertigo or throat pain   NECK: No pain or stiffness  RESPIRATORY: No cough, wheezing, hemoptysis; No shortness of breath  CARDIOVASCULAR: No chest pain or palpitations  GASTROINTESTINAL: No abdominal or epigastric pain. No nausea, vomiting, or hematemesis; No diarrhea or constipation. No melena or hematochezia.  GENITOURINARY: No dysuria, frequency or hematuria  NEUROLOGICAL: No numbness or weakness  SKIN: No itching, burning, rashes, or lesions   All other review of systems is negative unless indicated above.    MEDICATIONS  (STANDING):  lactated ringers. 1000 milliLiter(s) (200 mL/Hr) IV Continuous <Continuous>  pantoprazole  Injectable 40 milliGRAM(s) IV Push two times a day  piperacillin/tazobactam IVPB. 3.375 Gram(s) IV Intermittent every 8 hours    MEDICATIONS  (PRN):  HYDROmorphone  Injectable 1 milliGRAM(s) IV Push every 6 hours PRN Severe Pain (7 - 10)  morphine  - Injectable 4 milliGRAM(s) IV Push every 4 hours PRN Moderate Pain (4 - 6)  morphine  - Injectable 2 milliGRAM(s) IV Push every 4 hours PRN Mild Pain (1 - 3)  ondansetron Injectable 4 milliGRAM(s) IV Push every 6 hours PRN Nausea and/or Vomiting      Allergies    No Known Allergies    Intolerances        SOCIAL HISTORY: denies smoking    FAMILY HISTORY: non contributory      Vital Signs Last 24 Hrs  T(C): 37.4 (30 Mar 2018 14:01), Max: 37.5 (29 Mar 2018 22:35)  T(F): 99.3 (30 Mar 2018 14:), Max: 99.5 (29 Mar 2018 22:35)  HR: 98 (30 Mar 2018 14:) (66 - 98)  BP: 102/68 (30 Mar 2018 14:) (102/68 - 125/651)  BP(mean): --  RR: 17 (30 Mar 2018 14:) (14 - )  SpO2: 96% (30 Mar 2018 14:) (96% - 100%)    .  VITAL SIGNS:  T(C): 37.4 (18 @ 14:01), Max: 37.5 (18 @ 22:35)  T(F): 99.3 (18 @ 14:01), Max: 99.5 (18 @ 22:35)  HR: 98 (18 @ 14:01) (66 - 98)  BP: 102/68 (18 @ 14:01) (102/68 - 125/651)  BP(mean): --  RR: 17 (18 @ 14:01) (14 - 17)  SpO2: 96% (18 @ 14:01) (96% - 100%)  Wt(kg): --    PHYSICAL EXAM:    Constitutional: resting comfortably in bed; NAD  Eyes: PERRL, EOMI, anicteric sclera  ENT: no nasal discharge; uvula midline, no oropharyngeal erythema or exudates  Neck: supple; no JVD or thyromegaly  Respiratory: CTA B/L; no W/R/R, no retractions  Cardiac: +S1/S2; RRR; no murmurs  Gastrointestinal: soft, NT/ND; no rebound or guarding; +BSx4  Back: spine midline, no bony tenderness or step-offs; no CVAT B/L  Extremities: no clubbing or cyanosis; no peripheral edema  Musculoskeletal: NROM x4; no joint swelling, tenderness or erythema  Vascular: 2+ radial, femoral, DP/PT pulses B/L  Dermatologic: skin warm, dry and intact; no rashes, wounds, or scars  Lymphatic: no submandibular or cervical LAD  Neurologic: AAOx3; CNII-XII grossly intact; no focal deficits  Psychiatric: affect and characteristics of appearance, verbalizations, behaviors are appropriate    LABS:                        13.4   12.8  )-----------( 280      ( 30 Mar 2018 08:17 )             41.3           135  |  101  |  10  ----------------------------<  103<H>  3.9   |  25  |  0.98    Ca    8.2<L>      30 Mar 2018 07:04    TPro  6.2  /  Alb  2.7<L>  /  TBili  5.1<H>  /  DBili  x   /  AST  329<H>  /  ALT  460<H>  /  AlkPhos  287<H>  30      PT/INR - ( 29 Mar 2018 13:15 )   PT: 11.4 sec;   INR: 1.04 ratio         PTT - ( 29 Mar 2018 13:15 )  PTT:33.6 sec    Urinalysis Basic - ( 29 Mar 2018 13:15 )    Color: Mendez / Appearance: Clear / S.020 / pH: x  Gluc: x / Ketone: Moderate  / Bili: Large / Urobili: 8   Blood: x / Protein: 75 mg/dL / Nitrite: Positive   Leuk Esterase: Small / RBC: 0-2 /HPF / WBC 0-2   Sq Epi: x / Non Sq Epi: x / Bacteria: Few        RADIOLOGY & ADDITIONAL STUDIES:

## 2018-03-30 NOTE — PROGRESS NOTE ADULT - PROBLEM SELECTOR PLAN 1
CBD obstruction s/p ERCP with stone removal and stent placement  IV abx   IVF, keep NPO  monitor LFTs, downtrending

## 2018-03-30 NOTE — PROGRESS NOTE ADULT - ASSESSMENT
42M with PMH GERD presents with 1 week of left flank pain and intractable nausea, vomiting, admitted for biliary pancreatitis secondary to choledocholithiasis

## 2018-03-30 NOTE — CONSULT NOTE ADULT - ASSESSMENT
43 yo male with gallstone pancreatitis s/p ERCP/stent placement/ extraction CBD stone  -I offered patient to have lap cholecystectomy during same hospital admission, patient and patient's father refused it. They want to go home and have surgery later on , they claim patient is too weak, and want to wait few weeks from now. I told them risk about 30-40% of developing another episode of gallstone pancreatitis in 6 weeks, they understood and want to wait.  -Advance diet as tolerated

## 2018-03-30 NOTE — DISCHARGE NOTE ADULT - MEDICATION SUMMARY - MEDICATIONS TO TAKE
I will START or STAY ON the medications listed below when I get home from the hospital:    Advil 200 mg oral tablet  -- 1 tab(s) by mouth 2 times a day (after meals), As Needed for  abd  pain   -- Indication: For  gall stone Pancreatitis/ abd pain I will START or STAY ON the medications listed below when I get home from the hospital:    Advil 200 mg oral tablet  -- 1 tab(s) by mouth 2 times a day (after meals), As Needed for  abd  pain mild to moderate pain  -- Indication: For Pancraetitis acute     Percocet 5/325 oral tablet  -- 1 tab(s) by mouth every 12 hours, As Needed  for sever abd pain MDD:2  -- Caution federal law prohibits the transfer of this drug to any person other  than the person for whom it was prescribed.  May cause drowsiness.  Alcohol may intensify this effect.  Use care when operating dangerous machinery.  This prescription cannot be refilled.  This product contains acetaminophen.  Do not use  with any other product containing acetaminophen to prevent possible liver damage.  Using more of this medication than prescribed may cause serious breathing problems.    -- Indication: For Acute pancreatitis     ondansetron 4 mg oral tablet  -- 1 tab(s) by mouth every 8 hours, As Needed for nausea  -- Indication: For Nausea & vomiting

## 2018-03-31 LAB
ALBUMIN SERPL ELPH-MCNC: 2.6 G/DL — LOW (ref 3.3–5)
ALP SERPL-CCNC: 246 U/L — HIGH (ref 40–120)
ALT FLD-CCNC: 316 U/L — HIGH (ref 12–78)
ANION GAP SERPL CALC-SCNC: 6 MMOL/L — SIGNIFICANT CHANGE UP (ref 5–17)
AST SERPL-CCNC: 135 U/L — HIGH (ref 15–37)
BASOPHILS # BLD AUTO: 0.1 K/UL — SIGNIFICANT CHANGE UP (ref 0–0.2)
BASOPHILS NFR BLD AUTO: 0.7 % — SIGNIFICANT CHANGE UP (ref 0–2)
BILIRUB SERPL-MCNC: 2.6 MG/DL — HIGH (ref 0.2–1.2)
BUN SERPL-MCNC: 8 MG/DL — SIGNIFICANT CHANGE UP (ref 7–23)
CALCIUM SERPL-MCNC: 8.4 MG/DL — LOW (ref 8.5–10.1)
CHLORIDE SERPL-SCNC: 103 MMOL/L — SIGNIFICANT CHANGE UP (ref 96–108)
CO2 SERPL-SCNC: 28 MMOL/L — SIGNIFICANT CHANGE UP (ref 22–31)
CREAT SERPL-MCNC: 0.81 MG/DL — SIGNIFICANT CHANGE UP (ref 0.5–1.3)
CULTURE RESULTS: NO GROWTH — SIGNIFICANT CHANGE UP
EOSINOPHIL # BLD AUTO: 0.4 K/UL — SIGNIFICANT CHANGE UP (ref 0–0.5)
EOSINOPHIL NFR BLD AUTO: 2.6 % — SIGNIFICANT CHANGE UP (ref 0–6)
GLUCOSE SERPL-MCNC: 99 MG/DL — SIGNIFICANT CHANGE UP (ref 70–99)
HCT VFR BLD CALC: 36.6 % — LOW (ref 39–50)
HGB BLD-MCNC: 11.8 G/DL — LOW (ref 13–17)
LIDOCAIN IGE QN: 5210 U/L — HIGH (ref 73–393)
LYMPHOCYTES # BLD AUTO: 1.2 K/UL — SIGNIFICANT CHANGE UP (ref 1–3.3)
LYMPHOCYTES # BLD AUTO: 8.3 % — LOW (ref 13–44)
MCHC RBC-ENTMCNC: 29.8 PG — SIGNIFICANT CHANGE UP (ref 27–34)
MCHC RBC-ENTMCNC: 32.4 GM/DL — SIGNIFICANT CHANGE UP (ref 32–36)
MCV RBC AUTO: 92 FL — SIGNIFICANT CHANGE UP (ref 80–100)
MONOCYTES # BLD AUTO: 1.6 K/UL — HIGH (ref 0–0.9)
MONOCYTES NFR BLD AUTO: 11 % — HIGH (ref 1–9)
NEUTROPHILS # BLD AUTO: 11.4 K/UL — HIGH (ref 1.8–7.4)
NEUTROPHILS NFR BLD AUTO: 77.4 % — HIGH (ref 43–77)
PLATELET # BLD AUTO: 253 K/UL — SIGNIFICANT CHANGE UP (ref 150–400)
POTASSIUM SERPL-MCNC: 3.7 MMOL/L — SIGNIFICANT CHANGE UP (ref 3.5–5.3)
POTASSIUM SERPL-SCNC: 3.7 MMOL/L — SIGNIFICANT CHANGE UP (ref 3.5–5.3)
PROT SERPL-MCNC: 6 G/DL — SIGNIFICANT CHANGE UP (ref 6–8.3)
RBC # BLD: 3.97 M/UL — LOW (ref 4.2–5.8)
RBC # FLD: 13.2 % — SIGNIFICANT CHANGE UP (ref 10.3–14.5)
SODIUM SERPL-SCNC: 137 MMOL/L — SIGNIFICANT CHANGE UP (ref 135–145)
SPECIMEN SOURCE: SIGNIFICANT CHANGE UP
WBC # BLD: 14.8 K/UL — HIGH (ref 3.8–10.5)
WBC # FLD AUTO: 14.8 K/UL — HIGH (ref 3.8–10.5)

## 2018-03-31 PROCEDURE — 99233 SBSQ HOSP IP/OBS HIGH 50: CPT

## 2018-03-31 RX ADMIN — PANTOPRAZOLE SODIUM 40 MILLIGRAM(S): 20 TABLET, DELAYED RELEASE ORAL at 18:12

## 2018-03-31 RX ADMIN — PIPERACILLIN AND TAZOBACTAM 25 GRAM(S): 4; .5 INJECTION, POWDER, LYOPHILIZED, FOR SOLUTION INTRAVENOUS at 06:15

## 2018-03-31 RX ADMIN — SODIUM CHLORIDE 150 MILLILITER(S): 9 INJECTION, SOLUTION INTRAVENOUS at 19:10

## 2018-03-31 RX ADMIN — PANTOPRAZOLE SODIUM 40 MILLIGRAM(S): 20 TABLET, DELAYED RELEASE ORAL at 06:17

## 2018-03-31 NOTE — PROGRESS NOTE ADULT - ATTENDING COMMENTS
patient refusing surgery despite risks of keeping gallbladder in   advance diet as tolerated  dc once tolerating regular food  lfts/lipase improving  outpatient follow up with us in office after discharge
pt seen and examine  today see above - pt with gall stone pancreatitis s/p ercp and cbd stone extraction with stent placement - now on full liquid diet , cont iv fluid , dc iv abx blood cult neg , pain meds as needed , fu lft / lipase in am.
Diet advanced and patient tolerated. IV fluid rate decreased. Likely discharge tomorrow 3/31 if continues improving. Patient and father refusing lap luann at this time despite explanation of high likelihood of recurrence of gallstone pancreatitis within 6 weeks. They feel patient is too weak for surgery and wish to wait.

## 2018-03-31 NOTE — PROGRESS NOTE ADULT - PROBLEM SELECTOR PLAN 2
-s/p ERCP yesterday for CBD stone removal and stent placement. GI follow up for stent removal in 8 weeks.   LFT's trending down. Will continue to monitor   - continue to advance diet as tolerated   - continue morphine/ Dilaudid for pain and Zofran for nausea  - surgery consulted- Dr. Obrien out pt fu as pt refused surgery this time

## 2018-03-31 NOTE — PROGRESS NOTE ADULT - PROBLEM SELECTOR PLAN 4
DVT PPx: SCD   IMPROVE VTE Individual Risk Assessment          RISK       Points    [  ] Previous VTE 3  [  ] Thrombophilia  2  [  ] Lower limb paralysis  2        (unable to hold up >15 seconds)    [  ] Current Cancer    2         (within 6 months)  [ x ] Immobilization > 24 hrs 1  [  ] ICU/CCU stay > 24 hours   1  [  ] Age > 60  1  IMPROVE VTE Score: 0
DVT PPx: SCD   IMPROVE VTE Individual Risk Assessment          RISK       Points    [  ] Previous VTE 3  [  ] Thrombophilia  2  [  ] Lower limb paralysis  2        (unable to hold up >15 seconds)    [  ] Current Cancer    2         (within 6 months)  [ x ] Immobilization > 24 hrs 1  [  ] ICU/CCU stay > 24 hours   1  [  ] Age > 60  1  IMPROVE VTE Score: 0

## 2018-03-31 NOTE — PROGRESS NOTE ADULT - SUBJECTIVE AND OBJECTIVE BOX
Patient is a 42y old  Male who presents with a chief complaint of severe pain, likely / choledocholithiasis (30 Mar 2018 15:20)      HPI:  42M with PMH GERD presents with 1 week of Left flank pain.  Two days prior to admission, patient states his urine became the color of caramel and he noticed himself becoming jaundiced.  On the day of admission, patients states the pain became extremely severe, accompanied by intractable nausea, vomiting.  Pain is located in left flank area, radiates towards groin.  Accompanied by URQ pain.  ROS: (+) fevers, chills, nausea, vomiting, abdominal pain, difficulty taking a deep breath 2/2 pain. (-) chest pain/palpitations,       admitted for biliary pancreatitis secondary to choledocholithiasis post ercp stent and cbd stone extraction pt seen and examine today feel better , state no nausea , no abd pain , no fever , no distress , npo .     INTERVAL HPI/OVERNIGHT EVENTS:  T(C): 37.2 (18 @ 05:44), Max: 37.4 (18 @ 14:01)  HR: 89 (18 @ 05:44) (89 - 98)  BP: 104/65 (18 @ 05:44) (102/68 - 110/71)  RR: 17 (18 @ 05:44) (16 - 17)  SpO2: 97% (18 @ 05:44) (96% - 99%)  Wt(kg): --  I&O's Summary    30 Mar 2018 07:01  -  31 Mar 2018 07:00  --------------------------------------------------------  IN: 2300 mL / OUT: 2625 mL / NET: -325 mL        REVIEW OF SYSTEMS:  CONSTITUTIONAL: No fever, weight loss, yes  fatigue  EYES: No eye pain, visual disturbances, or discharge  ENMT:   No sinus or throat pain  NECK: No pain or stiffness  RESPIRATORY: No cough, wheezing, chills ; No shortness of breath  CARDIOVASCULAR: No chest pain, palpitations, dizziness, or leg swelling  GASTROINTESTINAL: No abdominal or epigastric pain. No nausea, vomiting, No diarrhea or constipation. No melena   GENITOURINARY: No dysuria, frequency,   NEUROLOGICAL: No headaches, memory loss, loss of strength, numbness,   SKIN: No itching, burning, rashes, or lesions     MUSCULOSKELETAL: No joint pain or swelling; No muscle, back, or extremity pain      PHYSICAL EXAM:  GENERAL: NAD, well-groomed, well-developed  HEAD:  Atraumatic, Normocephalic  EYES: EOMI, PERRLA, conjunctiva and sclera clear  ENMT:  Moist mucous membranes, , No lesions  NECK: Supple, No JVD,   NERVOUS SYSTEM:  Alert & Oriented X3, Good concentration; Motor Strength 5/5 B/L upper and lower extremities; DTRs 2+ intact   CHEST/LUNG: Clear to percussion bilaterally; No rales, rhonchi, wheezing,   HEART: Regular rate and rhythm; No murmurs, no tachy   ABDOMEN: Soft,  mild tender, Nondistended; Bowel sounds present  EXTREMITIES:  2+ Peripheral Pulses, No clubbing, cyanosis, or edema    SKIN: No rashes or lesions    MEDICATIONS  (STANDING):  lactated ringers. 1000 milliLiter(s) (150 mL/Hr) IV Continuous <Continuous>  pantoprazole  Injectable 40 milliGRAM(s) IV Push two times a day  piperacillin/tazobactam IVPB. 3.375 Gram(s) IV Intermittent every 8 hours    MEDICATIONS  (PRN):  HYDROmorphone  Injectable 1 milliGRAM(s) IV Push every 6 hours PRN Severe Pain (7 - 10)  morphine  - Injectable 4 milliGRAM(s) IV Push every 4 hours PRN Moderate Pain (4 - 6)  morphine  - Injectable 2 milliGRAM(s) IV Push every 4 hours PRN Mild Pain (1 - 3)  ondansetron Injectable 4 milliGRAM(s) IV Push every 6 hours PRN Nausea and/or Vomiting      LABS:                        11.8   14.8  )-----------( 253      ( 31 Mar 2018 06:21 )             36.6     -    137  |  103  |  8   ----------------------------<  99  3.7   |  28  |  0.81    Ca    8.4<L>      31 Mar 2018 06:21    TPro  6.0  /  Alb  2.6<L>  /  TBili  2.6<H>  /  DBili  x   /  AST  135<H>  /  ALT  316<H>  /  AlkPhos  246<H>      PT/INR - ( 29 Mar 2018 13:15 )   PT: 11.4 sec;   INR: 1.04 ratio         PTT - ( 29 Mar 2018 13:15 )  PTT:33.6 sec  Urinalysis Basic - ( 29 Mar 2018 13:15 )    Color: Josee / Appearance: Clear / S.020 / pH: x  Gluc: x / Ketone: Moderate  / Bili: Large / Urobili: 8   Blood: x / Protein: 75 mg/dL / Nitrite: Positive   Leuk Esterase: Small / RBC: 0-2 /HPF / WBC 0-2   Sq Epi: x / Non Sq Epi: x / Bacteria: Few      CAPILLARY BLOOD GLUCOSE           @ 18:08   No growth to date.  --  --          RADIOLOGY & ADDITIONAL TESTS:    Imaging Personally Reviewed:     no new test   Advance Directives:  full code

## 2018-03-31 NOTE — PROGRESS NOTE ADULT - PROBLEM SELECTOR PLAN 1
-obstructive pancreatitis 2/2 choledocholithiasis confirmed on CT  - s/p stone removal and stent placement via ERCP  - WBC trending down, monitor  - alk phos, ALT, AST, T.Amadeo elevated- all trending down   - advanced diet to  full liquid  as per GI recs  - dc  IV Zosyn  blood cult neg / cont IVF w/ LR @ 150  - continue morphine/ Dilaudid for pain and Zofran for nausea

## 2018-03-31 NOTE — PROGRESS NOTE ADULT - PROBLEM SELECTOR PLAN 3
-Likely due to obstructing stone in CBD  -s/p ERCP  for CBD stone removal and stent placement  -  LFT's trending down. Will continue to monitor

## 2018-03-31 NOTE — PROGRESS NOTE ADULT - ASSESSMENT
42M with PMH GERD presents with 1 week of left flank pain and intractable nausea, vomiting, admitted for biliary pancreatitis secondary to choledocholithiasis post ercp stent and cbd stone extraction

## 2018-04-01 VITALS
SYSTOLIC BLOOD PRESSURE: 111 MMHG | OXYGEN SATURATION: 96 % | RESPIRATION RATE: 16 BRPM | HEART RATE: 93 BPM | TEMPERATURE: 98 F | DIASTOLIC BLOOD PRESSURE: 70 MMHG

## 2018-04-01 LAB
ALBUMIN SERPL ELPH-MCNC: 2.6 G/DL — LOW (ref 3.3–5)
ALP SERPL-CCNC: 215 U/L — HIGH (ref 40–120)
ALT FLD-CCNC: 228 U/L — HIGH (ref 12–78)
ANION GAP SERPL CALC-SCNC: 9 MMOL/L — SIGNIFICANT CHANGE UP (ref 5–17)
AST SERPL-CCNC: 65 U/L — HIGH (ref 15–37)
BASOPHILS # BLD AUTO: 0.2 K/UL — SIGNIFICANT CHANGE UP (ref 0–0.2)
BASOPHILS NFR BLD AUTO: 1.2 % — SIGNIFICANT CHANGE UP (ref 0–2)
BILIRUB DIRECT SERPL-MCNC: 1.2 MG/DL — HIGH (ref 0.05–0.2)
BILIRUB INDIRECT FLD-MCNC: 0.9 MG/DL — SIGNIFICANT CHANGE UP (ref 0.2–1)
BILIRUB SERPL-MCNC: 2.1 MG/DL — HIGH (ref 0.2–1.2)
BUN SERPL-MCNC: 6 MG/DL — LOW (ref 7–23)
CALCIUM SERPL-MCNC: 8.8 MG/DL — SIGNIFICANT CHANGE UP (ref 8.5–10.1)
CHLORIDE SERPL-SCNC: 104 MMOL/L — SIGNIFICANT CHANGE UP (ref 96–108)
CO2 SERPL-SCNC: 26 MMOL/L — SIGNIFICANT CHANGE UP (ref 22–31)
CREAT SERPL-MCNC: 0.84 MG/DL — SIGNIFICANT CHANGE UP (ref 0.5–1.3)
EOSINOPHIL # BLD AUTO: 0.5 K/UL — SIGNIFICANT CHANGE UP (ref 0–0.5)
EOSINOPHIL NFR BLD AUTO: 3.4 % — SIGNIFICANT CHANGE UP (ref 0–6)
GLUCOSE SERPL-MCNC: 86 MG/DL — SIGNIFICANT CHANGE UP (ref 70–99)
HCT VFR BLD CALC: 37.8 % — LOW (ref 39–50)
HGB BLD-MCNC: 12 G/DL — LOW (ref 13–17)
LIDOCAIN IGE QN: 3263 U/L — HIGH (ref 73–393)
LYMPHOCYTES # BLD AUTO: 1.5 K/UL — SIGNIFICANT CHANGE UP (ref 1–3.3)
LYMPHOCYTES # BLD AUTO: 10.7 % — LOW (ref 13–44)
MCHC RBC-ENTMCNC: 29.2 PG — SIGNIFICANT CHANGE UP (ref 27–34)
MCHC RBC-ENTMCNC: 31.8 GM/DL — LOW (ref 32–36)
MCV RBC AUTO: 91.8 FL — SIGNIFICANT CHANGE UP (ref 80–100)
MONOCYTES # BLD AUTO: 1.2 K/UL — HIGH (ref 0–0.9)
MONOCYTES NFR BLD AUTO: 8.9 % — SIGNIFICANT CHANGE UP (ref 1–9)
NEUTROPHILS # BLD AUTO: 10.6 K/UL — HIGH (ref 1.8–7.4)
NEUTROPHILS NFR BLD AUTO: 75.8 % — SIGNIFICANT CHANGE UP (ref 43–77)
PLATELET # BLD AUTO: 280 K/UL — SIGNIFICANT CHANGE UP (ref 150–400)
POTASSIUM SERPL-MCNC: 3.6 MMOL/L — SIGNIFICANT CHANGE UP (ref 3.5–5.3)
POTASSIUM SERPL-SCNC: 3.6 MMOL/L — SIGNIFICANT CHANGE UP (ref 3.5–5.3)
PROT SERPL-MCNC: 6.8 G/DL — SIGNIFICANT CHANGE UP (ref 6–8.3)
RBC # BLD: 4.12 M/UL — LOW (ref 4.2–5.8)
RBC # FLD: 13 % — SIGNIFICANT CHANGE UP (ref 10.3–14.5)
SODIUM SERPL-SCNC: 139 MMOL/L — SIGNIFICANT CHANGE UP (ref 135–145)
WBC # BLD: 13.9 K/UL — HIGH (ref 3.8–10.5)
WBC # FLD AUTO: 13.9 K/UL — HIGH (ref 3.8–10.5)

## 2018-04-01 PROCEDURE — 74177 CT ABD & PELVIS W/CONTRAST: CPT

## 2018-04-01 PROCEDURE — 96374 THER/PROPH/DIAG INJ IV PUSH: CPT | Mod: 59

## 2018-04-01 PROCEDURE — 87086 URINE CULTURE/COLONY COUNT: CPT

## 2018-04-01 PROCEDURE — C2625: CPT

## 2018-04-01 PROCEDURE — 76705 ECHO EXAM OF ABDOMEN: CPT

## 2018-04-01 PROCEDURE — 83605 ASSAY OF LACTIC ACID: CPT

## 2018-04-01 PROCEDURE — C1769: CPT

## 2018-04-01 PROCEDURE — 99285 EMERGENCY DEPT VISIT HI MDM: CPT | Mod: 25

## 2018-04-01 PROCEDURE — 85610 PROTHROMBIN TIME: CPT

## 2018-04-01 PROCEDURE — 83690 ASSAY OF LIPASE: CPT

## 2018-04-01 PROCEDURE — 76000 FLUOROSCOPY <1 HR PHYS/QHP: CPT

## 2018-04-01 PROCEDURE — 81001 URINALYSIS AUTO W/SCOPE: CPT

## 2018-04-01 PROCEDURE — 85730 THROMBOPLASTIN TIME PARTIAL: CPT

## 2018-04-01 PROCEDURE — 80053 COMPREHEN METABOLIC PANEL: CPT

## 2018-04-01 PROCEDURE — 82248 BILIRUBIN DIRECT: CPT

## 2018-04-01 PROCEDURE — 85027 COMPLETE CBC AUTOMATED: CPT

## 2018-04-01 PROCEDURE — 80076 HEPATIC FUNCTION PANEL: CPT

## 2018-04-01 PROCEDURE — 96376 TX/PRO/DX INJ SAME DRUG ADON: CPT

## 2018-04-01 PROCEDURE — 80048 BASIC METABOLIC PNL TOTAL CA: CPT

## 2018-04-01 PROCEDURE — 82150 ASSAY OF AMYLASE: CPT

## 2018-04-01 PROCEDURE — 96375 TX/PRO/DX INJ NEW DRUG ADDON: CPT

## 2018-04-01 PROCEDURE — 99239 HOSP IP/OBS DSCHRG MGMT >30: CPT

## 2018-04-01 PROCEDURE — C1889: CPT

## 2018-04-01 PROCEDURE — 87040 BLOOD CULTURE FOR BACTERIA: CPT

## 2018-04-01 RX ORDER — IBUPROFEN 200 MG
1 TABLET ORAL
Qty: 0 | Refills: 0 | COMMUNITY

## 2018-04-01 RX ORDER — ONDANSETRON 8 MG/1
1 TABLET, FILM COATED ORAL
Qty: 12 | Refills: 0 | OUTPATIENT
Start: 2018-04-01 | End: 2018-04-04

## 2018-04-01 RX ADMIN — PANTOPRAZOLE SODIUM 40 MILLIGRAM(S): 20 TABLET, DELAYED RELEASE ORAL at 05:56

## 2018-04-01 RX ADMIN — SODIUM CHLORIDE 150 MILLILITER(S): 9 INJECTION, SOLUTION INTRAVENOUS at 09:15

## 2018-04-03 LAB
CULTURE RESULTS: SIGNIFICANT CHANGE UP
CULTURE RESULTS: SIGNIFICANT CHANGE UP
SPECIMEN SOURCE: SIGNIFICANT CHANGE UP
SPECIMEN SOURCE: SIGNIFICANT CHANGE UP

## 2018-07-12 ENCOUNTER — INPATIENT (INPATIENT)
Facility: HOSPITAL | Age: 43
LOS: 4 days | Discharge: HOME | DRG: 418 | End: 2018-07-17
Attending: SURGERY | Admitting: SURGERY
Payer: MEDICAID

## 2018-07-12 VITALS
RESPIRATION RATE: 16 BRPM | HEART RATE: 96 BPM | HEIGHT: 71 IN | WEIGHT: 195.11 LBS | TEMPERATURE: 98 F | DIASTOLIC BLOOD PRESSURE: 78 MMHG | SYSTOLIC BLOOD PRESSURE: 125 MMHG | OXYGEN SATURATION: 99 %

## 2018-07-12 DIAGNOSIS — K81.0 ACUTE CHOLECYSTITIS: ICD-10-CM

## 2018-07-12 DIAGNOSIS — T85.9XXA UNSPECIFIED COMPLICATION OF INTERNAL PROSTHETIC DEVICE, IMPLANT AND GRAFT, INITIAL ENCOUNTER: Chronic | ICD-10-CM

## 2018-07-12 DIAGNOSIS — Z98.890 OTHER SPECIFIED POSTPROCEDURAL STATES: Chronic | ICD-10-CM

## 2018-07-12 PROBLEM — K21.9 GASTRO-ESOPHAGEAL REFLUX DISEASE WITHOUT ESOPHAGITIS: Chronic | Status: ACTIVE | Noted: 2018-03-29

## 2018-07-12 LAB
ALBUMIN SERPL ELPH-MCNC: 4.1 G/DL — SIGNIFICANT CHANGE UP (ref 3.3–5)
ALP SERPL-CCNC: 107 U/L — SIGNIFICANT CHANGE UP (ref 40–120)
ALT FLD-CCNC: 44 U/L — SIGNIFICANT CHANGE UP (ref 12–78)
ANION GAP SERPL CALC-SCNC: 8 MMOL/L — SIGNIFICANT CHANGE UP (ref 5–17)
APPEARANCE UR: CLEAR — SIGNIFICANT CHANGE UP
AST SERPL-CCNC: 23 U/L — SIGNIFICANT CHANGE UP (ref 15–37)
BASOPHILS # BLD AUTO: 0.05 K/UL — SIGNIFICANT CHANGE UP (ref 0–0.2)
BASOPHILS NFR BLD AUTO: 0.4 % — SIGNIFICANT CHANGE UP (ref 0–2)
BILIRUB SERPL-MCNC: 0.8 MG/DL — SIGNIFICANT CHANGE UP (ref 0.2–1.2)
BILIRUB UR-MCNC: NEGATIVE — SIGNIFICANT CHANGE UP
BUN SERPL-MCNC: 6 MG/DL — LOW (ref 7–23)
CALCIUM SERPL-MCNC: 9.3 MG/DL — SIGNIFICANT CHANGE UP (ref 8.5–10.1)
CHLORIDE SERPL-SCNC: 102 MMOL/L — SIGNIFICANT CHANGE UP (ref 96–108)
CO2 SERPL-SCNC: 30 MMOL/L — SIGNIFICANT CHANGE UP (ref 22–31)
COLOR SPEC: YELLOW — SIGNIFICANT CHANGE UP
CREAT SERPL-MCNC: 1.1 MG/DL — SIGNIFICANT CHANGE UP (ref 0.5–1.3)
DIFF PNL FLD: NEGATIVE — SIGNIFICANT CHANGE UP
EOSINOPHIL # BLD AUTO: 0.1 K/UL — SIGNIFICANT CHANGE UP (ref 0–0.5)
EOSINOPHIL NFR BLD AUTO: 0.9 % — SIGNIFICANT CHANGE UP (ref 0–6)
GLUCOSE SERPL-MCNC: 99 MG/DL — SIGNIFICANT CHANGE UP (ref 70–99)
GLUCOSE UR QL: NEGATIVE — SIGNIFICANT CHANGE UP
HCT VFR BLD CALC: 46.1 % — SIGNIFICANT CHANGE UP (ref 39–50)
HGB BLD-MCNC: 14.9 G/DL — SIGNIFICANT CHANGE UP (ref 13–17)
IMM GRANULOCYTES NFR BLD AUTO: 0.4 % — SIGNIFICANT CHANGE UP (ref 0–1.5)
KETONES UR-MCNC: NEGATIVE — SIGNIFICANT CHANGE UP
LEUKOCYTE ESTERASE UR-ACNC: NEGATIVE — SIGNIFICANT CHANGE UP
LIDOCAIN IGE QN: 205 U/L — SIGNIFICANT CHANGE UP (ref 73–393)
LYMPHOCYTES # BLD AUTO: 1.43 K/UL — SIGNIFICANT CHANGE UP (ref 1–3.3)
LYMPHOCYTES # BLD AUTO: 12.7 % — LOW (ref 13–44)
MCHC RBC-ENTMCNC: 28.5 PG — SIGNIFICANT CHANGE UP (ref 27–34)
MCHC RBC-ENTMCNC: 32.3 GM/DL — SIGNIFICANT CHANGE UP (ref 32–36)
MCV RBC AUTO: 88.3 FL — SIGNIFICANT CHANGE UP (ref 80–100)
MONOCYTES # BLD AUTO: 1.03 K/UL — HIGH (ref 0–0.9)
MONOCYTES NFR BLD AUTO: 9.1 % — SIGNIFICANT CHANGE UP (ref 2–14)
NEUTROPHILS # BLD AUTO: 8.61 K/UL — HIGH (ref 1.8–7.4)
NEUTROPHILS NFR BLD AUTO: 76.5 % — SIGNIFICANT CHANGE UP (ref 43–77)
NITRITE UR-MCNC: NEGATIVE — SIGNIFICANT CHANGE UP
PH UR: 8 — SIGNIFICANT CHANGE UP (ref 5–8)
PLATELET # BLD AUTO: 326 K/UL — SIGNIFICANT CHANGE UP (ref 150–400)
POTASSIUM SERPL-MCNC: 4.3 MMOL/L — SIGNIFICANT CHANGE UP (ref 3.5–5.3)
POTASSIUM SERPL-SCNC: 4.3 MMOL/L — SIGNIFICANT CHANGE UP (ref 3.5–5.3)
PROT SERPL-MCNC: 8.3 G/DL — SIGNIFICANT CHANGE UP (ref 6–8.3)
PROT UR-MCNC: NEGATIVE — SIGNIFICANT CHANGE UP
RBC # BLD: 5.22 M/UL — SIGNIFICANT CHANGE UP (ref 4.2–5.8)
RBC # FLD: 13.3 % — SIGNIFICANT CHANGE UP (ref 10.3–14.5)
SODIUM SERPL-SCNC: 140 MMOL/L — SIGNIFICANT CHANGE UP (ref 135–145)
SP GR SPEC: 1.01 — SIGNIFICANT CHANGE UP (ref 1.01–1.02)
UROBILINOGEN FLD QL: NEGATIVE — SIGNIFICANT CHANGE UP
WBC # BLD: 11.26 K/UL — HIGH (ref 3.8–10.5)
WBC # FLD AUTO: 11.26 K/UL — HIGH (ref 3.8–10.5)

## 2018-07-12 PROCEDURE — 76705 ECHO EXAM OF ABDOMEN: CPT | Mod: 26

## 2018-07-12 PROCEDURE — 74176 CT ABD & PELVIS W/O CONTRAST: CPT | Mod: 26

## 2018-07-12 PROCEDURE — 99285 EMERGENCY DEPT VISIT HI MDM: CPT

## 2018-07-12 RX ORDER — ONDANSETRON 8 MG/1
4 TABLET, FILM COATED ORAL EVERY 6 HOURS
Qty: 0 | Refills: 0 | Status: DISCONTINUED | OUTPATIENT
Start: 2018-07-12 | End: 2018-07-16

## 2018-07-12 RX ORDER — PIPERACILLIN AND TAZOBACTAM 4; .5 G/20ML; G/20ML
3.38 INJECTION, POWDER, LYOPHILIZED, FOR SOLUTION INTRAVENOUS ONCE
Qty: 0 | Refills: 0 | Status: COMPLETED | OUTPATIENT
Start: 2018-07-12 | End: 2018-07-12

## 2018-07-12 RX ORDER — DIPHENHYDRAMINE HCL 50 MG
25 CAPSULE ORAL EVERY 6 HOURS
Qty: 0 | Refills: 0 | Status: DISCONTINUED | OUTPATIENT
Start: 2018-07-12 | End: 2018-07-16

## 2018-07-12 RX ORDER — SODIUM CHLORIDE 9 MG/ML
1000 INJECTION INTRAMUSCULAR; INTRAVENOUS; SUBCUTANEOUS ONCE
Qty: 0 | Refills: 0 | Status: COMPLETED | OUTPATIENT
Start: 2018-07-12 | End: 2018-07-12

## 2018-07-12 RX ORDER — PIPERACILLIN AND TAZOBACTAM 4; .5 G/20ML; G/20ML
3.38 INJECTION, POWDER, LYOPHILIZED, FOR SOLUTION INTRAVENOUS EVERY 8 HOURS
Qty: 0 | Refills: 0 | Status: DISCONTINUED | OUTPATIENT
Start: 2018-07-12 | End: 2018-07-16

## 2018-07-12 RX ORDER — ONDANSETRON 8 MG/1
4 TABLET, FILM COATED ORAL ONCE
Qty: 0 | Refills: 0 | Status: COMPLETED | OUTPATIENT
Start: 2018-07-12 | End: 2018-07-12

## 2018-07-12 RX ORDER — MORPHINE SULFATE 50 MG/1
2 CAPSULE, EXTENDED RELEASE ORAL EVERY 6 HOURS
Qty: 0 | Refills: 0 | Status: DISCONTINUED | OUTPATIENT
Start: 2018-07-12 | End: 2018-07-16

## 2018-07-12 RX ORDER — IBUPROFEN 200 MG
1 TABLET ORAL
Qty: 0 | Refills: 0 | COMMUNITY

## 2018-07-12 RX ORDER — SODIUM CHLORIDE 9 MG/ML
1000 INJECTION, SOLUTION INTRAVENOUS
Qty: 0 | Refills: 0 | Status: DISCONTINUED | OUTPATIENT
Start: 2018-07-12 | End: 2018-07-14

## 2018-07-12 RX ORDER — MORPHINE SULFATE 50 MG/1
4 CAPSULE, EXTENDED RELEASE ORAL ONCE
Qty: 0 | Refills: 0 | Status: DISCONTINUED | OUTPATIENT
Start: 2018-07-12 | End: 2018-07-12

## 2018-07-12 RX ADMIN — PIPERACILLIN AND TAZOBACTAM 200 GRAM(S): 4; .5 INJECTION, POWDER, LYOPHILIZED, FOR SOLUTION INTRAVENOUS at 16:40

## 2018-07-12 RX ADMIN — SODIUM CHLORIDE 100 MILLILITER(S): 9 INJECTION, SOLUTION INTRAVENOUS at 20:08

## 2018-07-12 RX ADMIN — ONDANSETRON 4 MILLIGRAM(S): 8 TABLET, FILM COATED ORAL at 16:37

## 2018-07-12 RX ADMIN — SODIUM CHLORIDE 2000 MILLILITER(S): 9 INJECTION INTRAMUSCULAR; INTRAVENOUS; SUBCUTANEOUS at 16:36

## 2018-07-12 RX ADMIN — MORPHINE SULFATE 4 MILLIGRAM(S): 50 CAPSULE, EXTENDED RELEASE ORAL at 16:37

## 2018-07-12 RX ADMIN — MORPHINE SULFATE 2 MILLIGRAM(S): 50 CAPSULE, EXTENDED RELEASE ORAL at 20:49

## 2018-07-12 RX ADMIN — MORPHINE SULFATE 4 MILLIGRAM(S): 50 CAPSULE, EXTENDED RELEASE ORAL at 16:50

## 2018-07-12 RX ADMIN — MORPHINE SULFATE 2 MILLIGRAM(S): 50 CAPSULE, EXTENDED RELEASE ORAL at 20:19

## 2018-07-12 RX ADMIN — ONDANSETRON 4 MILLIGRAM(S): 8 TABLET, FILM COATED ORAL at 20:19

## 2018-07-12 NOTE — ED PROVIDER NOTE - OBJECTIVE STATEMENT
43/M complains of left sided flank pain x 2 days, worsening in nature, with associated nausea. Pain is non-radiating, described as sharp and constant. Patient denies any otc medications for relief. Denies any fever, chills SOB, Chest pain, vomiting, or diarrhea. Currently on triple therapy for H Pylori, on amoxicillin, omeprazole, and clarithromycin.

## 2018-07-12 NOTE — ED PROVIDER NOTE - MEDICAL DECISION MAKING DETAILS
43/M complaining of left flank pain x 2 days, worsening. No dysuria, urgency, or frequency. Currently on triple therapy for H Pylori. Will get labs, CT stone protocol, to evaluate for kidney stone. 43/M complaining of left flank pain x 2 days, worsening. No dysuria, urgency, or frequency. Currently on triple therapy for H Pylori. Will get labs, CT stone protocol, to evaluate for kidney stone.  Papo: See attending statement below

## 2018-07-12 NOTE — ED ADULT NURSE NOTE - OBJECTIVE STATEMENT
Patient c/o left flank pain x days.  He states that when he had this pain in the past it was his gallbladder

## 2018-07-12 NOTE — ED PROVIDER NOTE - PSH
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

## 2018-07-12 NOTE — ED PROVIDER NOTE - ATTENDING CONTRIBUTION TO CARE
43y M hx of GERD 43y M hx of GERD, H pylori on triple therapy, choledocholithiasis sp ERCP 43y M hx of GERD, H pylori on triple therapy, choledocholithiasis sp ERCP (March 2018), declined cholecystectomy at that point here now c/o similar sx. Pt reports for past 2 days has had L sided flank pain radiating up and down L side. Pain is 6/10 currently, intermittent, sharp pain, no alleviating factors. Denies fever, chills, cp, palp, sob, abd pain, diarrhea, testicular pain. +Nausea, no vomiting. Dec PO intake. Dec appetite.  Gen: WNWD NAD  HEENT: NCAT EOMI normal pharynx  Neck: supple  CV: RRR, no murmur  Lung: CTA BL  Abd: +BS soft NTND L CVAT Neg murphys, no epigastric TTP   Ext: wwp, palp pulses, FROMx4, no cce  SKin: warm dry intact, no jaundice   Neuro: A&Ox3, CN grossly intact, sensation intact, motor 5/5 throughout  AP: 43y M hx of GERD, H pylori on triple therapy, choledocholithiasis sp ERCP (March 2018), declined cholecystectomy at that point here now c/o similar sx incl L flank pain, nausea. R/o pancreatitis, biliary colic, renal colic. Labs, Ua, Ucx, CTAP, meds, re-eval.

## 2018-07-12 NOTE — H&P ADULT - PMH
Asthma    Choledocholithiasis    GERD (gastroesophageal reflux disease)    H pylori ulcer    Pancreatitis, gallstone

## 2018-07-12 NOTE — H&P ADULT - NSHPLABSRESULTS_GEN_ALL_CORE
14.9   11.26 )-----------( 326      ( 12 Jul 2018 14:54 )             46.1   07-12    140  |  102  |  6<L>  ----------------------------<  99  4.3   |  30  |  1.10    Ca    9.3      12 Jul 2018 14:54    TPro  8.3  /  Alb  4.1  /  TBili  0.8  /  DBili  x   /  AST  23  /  ALT  44  /  AlkPhos  107  07-12  < from: CT Abdomen and Pelvis No Cont (07.12.18 @ 16:00) >    Nonobstructive right nephrolithiasis.  No obstructive uropathy.  Pericholecystic fluid. Correlate with ultrasound as clinically indicated.      < end of copied text >

## 2018-07-12 NOTE — H&P ADULT - ASSESSMENT
42 yo male with CT concerning for cholecystitis.  PT recently admitted with gallstone pancreatitis and refused lap luann at that time. PT agreeable at this time      admit      IV abx      OR [facility]ander for lap luann

## 2018-07-12 NOTE — ED PROVIDER NOTE - PMH
Asthma    GERD (gastroesophageal reflux disease)    H pylori ulcer Asthma    Choledocholithiasis    GERD (gastroesophageal reflux disease)    H pylori ulcer    Pancreatitis, gallstone

## 2018-07-12 NOTE — H&P ADULT - HISTORY OF PRESENT ILLNESS
44 yo male with hx of gallstone pancreatitis 4 months ago presents with LUQ pain radiating to back.  PT states pain at this time is similar to previous episode. Pain for 3 days. +Nausea, -vomiting.  Denies fever/chills.  BM this  monring normal.  PT refused lap luann on previous admission due to being too weak.  PT strong currently and not refusing surgery

## 2018-07-12 NOTE — ED PROVIDER NOTE - PROGRESS NOTE DETAILS
[place], CT Scan with pericholecystic fluid, suspect acute luann, Dr Yin who had seen pt previous was contacted and is unavailable. States OK to contact whomever is on currently.

## 2018-07-12 NOTE — ED PROVIDER NOTE - PHYSICAL EXAMINATION
Physical Exam:  General: Well developed, well nourished, NAD  HEENT: NCAT, PERRLA, EOMI bl, moist mucous membranes   Neck: Supple, nontender, no mass  Neurology: A&Ox3, nonfocal, CN II-XII grossly intact, sensation intact, no gait abnormalities   Respiratory: CTA B/L, No W/R/R  CV: RRR, +S1/S2, no murmurs, rubs or gallops  Abdominal: Soft, NT, ND +BSx4. Left sided CVA Tenderness  Extremities: No C/C/E, + peripheral pulses  MSK: Normal ROM, no joint erythema or warmth, no joint swelling   Skin: warm, dry, normal color, no rash or abnormal lesions

## 2018-07-13 DIAGNOSIS — K91.89 OTHER POSTPROCEDURAL COMPLICATIONS AND DISORDERS OF DIGESTIVE SYSTEM: ICD-10-CM

## 2018-07-13 LAB
ALBUMIN SERPL ELPH-MCNC: 3.5 G/DL — SIGNIFICANT CHANGE UP (ref 3.3–5)
ALP SERPL-CCNC: 117 U/L — SIGNIFICANT CHANGE UP (ref 40–120)
ALT FLD-CCNC: 46 U/L — SIGNIFICANT CHANGE UP (ref 12–78)
ANION GAP SERPL CALC-SCNC: 7 MMOL/L — SIGNIFICANT CHANGE UP (ref 5–17)
AST SERPL-CCNC: 31 U/L — SIGNIFICANT CHANGE UP (ref 15–37)
BILIRUB SERPL-MCNC: 1.1 MG/DL — SIGNIFICANT CHANGE UP (ref 0.2–1.2)
BUN SERPL-MCNC: 6 MG/DL — LOW (ref 7–23)
CALCIUM SERPL-MCNC: 8.6 MG/DL — SIGNIFICANT CHANGE UP (ref 8.5–10.1)
CHLORIDE SERPL-SCNC: 103 MMOL/L — SIGNIFICANT CHANGE UP (ref 96–108)
CO2 SERPL-SCNC: 30 MMOL/L — SIGNIFICANT CHANGE UP (ref 22–31)
CREAT SERPL-MCNC: 1.1 MG/DL — SIGNIFICANT CHANGE UP (ref 0.5–1.3)
CULTURE RESULTS: NO GROWTH — SIGNIFICANT CHANGE UP
GLUCOSE SERPL-MCNC: 117 MG/DL — HIGH (ref 70–99)
POTASSIUM SERPL-MCNC: 4.5 MMOL/L — SIGNIFICANT CHANGE UP (ref 3.5–5.3)
POTASSIUM SERPL-SCNC: 4.5 MMOL/L — SIGNIFICANT CHANGE UP (ref 3.5–5.3)
PROT SERPL-MCNC: 7.2 G/DL — SIGNIFICANT CHANGE UP (ref 6–8.3)
SODIUM SERPL-SCNC: 140 MMOL/L — SIGNIFICANT CHANGE UP (ref 135–145)
SPECIMEN SOURCE: SIGNIFICANT CHANGE UP

## 2018-07-13 PROCEDURE — 88304 TISSUE EXAM BY PATHOLOGIST: CPT | Mod: 26

## 2018-07-13 RX ORDER — OXYCODONE HYDROCHLORIDE 5 MG/1
10 TABLET ORAL EVERY 6 HOURS
Qty: 0 | Refills: 0 | Status: DISCONTINUED | OUTPATIENT
Start: 2018-07-13 | End: 2018-07-13

## 2018-07-13 RX ORDER — OXYCODONE AND ACETAMINOPHEN 5; 325 MG/1; MG/1
1 TABLET ORAL EVERY 4 HOURS
Qty: 0 | Refills: 0 | Status: DISCONTINUED | OUTPATIENT
Start: 2018-07-13 | End: 2018-07-16

## 2018-07-13 RX ORDER — PANTOPRAZOLE SODIUM 20 MG/1
40 TABLET, DELAYED RELEASE ORAL
Qty: 0 | Refills: 0 | Status: DISCONTINUED | OUTPATIENT
Start: 2018-07-13 | End: 2018-07-16

## 2018-07-13 RX ORDER — OXYCODONE AND ACETAMINOPHEN 5; 325 MG/1; MG/1
2 TABLET ORAL EVERY 6 HOURS
Qty: 0 | Refills: 0 | Status: DISCONTINUED | OUTPATIENT
Start: 2018-07-13 | End: 2018-07-16

## 2018-07-13 RX ORDER — MORPHINE SULFATE 50 MG/1
2 CAPSULE, EXTENDED RELEASE ORAL EVERY 4 HOURS
Qty: 0 | Refills: 0 | Status: DISCONTINUED | OUTPATIENT
Start: 2018-07-13 | End: 2018-07-16

## 2018-07-13 RX ORDER — OXYCODONE HYDROCHLORIDE 5 MG/1
5 TABLET ORAL EVERY 4 HOURS
Qty: 0 | Refills: 0 | Status: DISCONTINUED | OUTPATIENT
Start: 2018-07-13 | End: 2018-07-13

## 2018-07-13 RX ORDER — SODIUM CHLORIDE 9 MG/ML
1000 INJECTION, SOLUTION INTRAVENOUS
Qty: 0 | Refills: 0 | Status: DISCONTINUED | OUTPATIENT
Start: 2018-07-13 | End: 2018-07-13

## 2018-07-13 RX ORDER — ONDANSETRON 8 MG/1
4 TABLET, FILM COATED ORAL EVERY 6 HOURS
Qty: 0 | Refills: 0 | Status: DISCONTINUED | OUTPATIENT
Start: 2018-07-13 | End: 2018-07-16

## 2018-07-13 RX ORDER — MEPERIDINE HYDROCHLORIDE 50 MG/ML
12.5 INJECTION INTRAMUSCULAR; INTRAVENOUS; SUBCUTANEOUS ONCE
Qty: 0 | Refills: 0 | Status: DISCONTINUED | OUTPATIENT
Start: 2018-07-13 | End: 2018-07-13

## 2018-07-13 RX ORDER — ONDANSETRON 8 MG/1
4 TABLET, FILM COATED ORAL ONCE
Qty: 0 | Refills: 0 | Status: DISCONTINUED | OUTPATIENT
Start: 2018-07-13 | End: 2018-07-13

## 2018-07-13 RX ORDER — SODIUM CHLORIDE 9 MG/ML
1000 INJECTION, SOLUTION INTRAVENOUS
Qty: 0 | Refills: 0 | Status: DISCONTINUED | OUTPATIENT
Start: 2018-07-13 | End: 2018-07-14

## 2018-07-13 RX ORDER — HYDROMORPHONE HYDROCHLORIDE 2 MG/ML
0.5 INJECTION INTRAMUSCULAR; INTRAVENOUS; SUBCUTANEOUS
Qty: 0 | Refills: 0 | Status: DISCONTINUED | OUTPATIENT
Start: 2018-07-13 | End: 2018-07-13

## 2018-07-13 RX ADMIN — HYDROMORPHONE HYDROCHLORIDE 0.5 MILLIGRAM(S): 2 INJECTION INTRAMUSCULAR; INTRAVENOUS; SUBCUTANEOUS at 17:09

## 2018-07-13 RX ADMIN — HYDROMORPHONE HYDROCHLORIDE 0.5 MILLIGRAM(S): 2 INJECTION INTRAMUSCULAR; INTRAVENOUS; SUBCUTANEOUS at 16:59

## 2018-07-13 RX ADMIN — MORPHINE SULFATE 2 MILLIGRAM(S): 50 CAPSULE, EXTENDED RELEASE ORAL at 06:33

## 2018-07-13 RX ADMIN — SODIUM CHLORIDE 100 MILLILITER(S): 9 INJECTION, SOLUTION INTRAVENOUS at 15:49

## 2018-07-13 RX ADMIN — HYDROMORPHONE HYDROCHLORIDE 0.5 MILLIGRAM(S): 2 INJECTION INTRAMUSCULAR; INTRAVENOUS; SUBCUTANEOUS at 16:49

## 2018-07-13 RX ADMIN — SODIUM CHLORIDE 100 MILLILITER(S): 9 INJECTION, SOLUTION INTRAVENOUS at 06:11

## 2018-07-13 RX ADMIN — PIPERACILLIN AND TAZOBACTAM 25 GRAM(S): 4; .5 INJECTION, POWDER, LYOPHILIZED, FOR SOLUTION INTRAVENOUS at 17:20

## 2018-07-13 RX ADMIN — HYDROMORPHONE HYDROCHLORIDE 0.5 MILLIGRAM(S): 2 INJECTION INTRAMUSCULAR; INTRAVENOUS; SUBCUTANEOUS at 16:37

## 2018-07-13 RX ADMIN — MEPERIDINE HYDROCHLORIDE 12.5 MILLIGRAM(S): 50 INJECTION INTRAMUSCULAR; INTRAVENOUS; SUBCUTANEOUS at 15:59

## 2018-07-13 RX ADMIN — PIPERACILLIN AND TAZOBACTAM 25 GRAM(S): 4; .5 INJECTION, POWDER, LYOPHILIZED, FOR SOLUTION INTRAVENOUS at 01:30

## 2018-07-13 RX ADMIN — OXYCODONE AND ACETAMINOPHEN 2 TABLET(S): 5; 325 TABLET ORAL at 19:26

## 2018-07-13 RX ADMIN — SODIUM CHLORIDE 100 MILLILITER(S): 9 INJECTION, SOLUTION INTRAVENOUS at 15:53

## 2018-07-13 RX ADMIN — MORPHINE SULFATE 2 MILLIGRAM(S): 50 CAPSULE, EXTENDED RELEASE ORAL at 06:11

## 2018-07-13 RX ADMIN — PIPERACILLIN AND TAZOBACTAM 25 GRAM(S): 4; .5 INJECTION, POWDER, LYOPHILIZED, FOR SOLUTION INTRAVENOUS at 08:47

## 2018-07-13 RX ADMIN — MEPERIDINE HYDROCHLORIDE 12.5 MILLIGRAM(S): 50 INJECTION INTRAMUSCULAR; INTRAVENOUS; SUBCUTANEOUS at 15:49

## 2018-07-13 NOTE — CONSULT NOTE ADULT - SUBJECTIVE AND OBJECTIVE BOX
Chief Complaint:  Patient is a 43y old  Male who presents with a chief complaint of cholecystitis (2018 17:43)    Pancreatitis, gallstone  Choledocholithiasis  H pylori ulcer  GERD (gastroesophageal reflux disease)  Asthma  No pertinent past medical history  H/O endoscopic retrograde cholangiopancreatography  Disorder of bile duct stent  No significant past surgical history     HPI:  42 yo male with hx of gallstone pancreatitis 4 months ago presents with LUQ pain radiating to back.  PT states pain at this time is similar to previous episode. Pain for 3 days. +Nausea, -vomiting.  Denies fever/chills.  BM this  monring normal.  PT refused lap luann on previous admission due to being too weak.  PT strong currently and not refusing surgery (2018 17:43)  GI called for possible bile leak.      No Known Allergies      diphenhydrAMINE   Injectable 25 milliGRAM(s) IV Push every 6 hours PRN  HYDROmorphone  Injectable 0.5 milliGRAM(s) IV Push every 10 minutes PRN  lactated ringers. 1000 milliLiter(s) IV Continuous <Continuous>  lactated ringers. 1000 milliLiter(s) IV Continuous <Continuous>  morphine  - Injectable 2 milliGRAM(s) IV Push every 6 hours PRN  ondansetron Injectable 4 milliGRAM(s) IV Push every 6 hours PRN  ondansetron Injectable 4 milliGRAM(s) IV Push once PRN  oxyCODONE    IR 5 milliGRAM(s) Oral every 4 hours PRN  oxyCODONE    IR 10 milliGRAM(s) Oral every 6 hours PRN  piperacillin/tazobactam IVPB. 3.375 Gram(s) IV Intermittent every 8 hours        FAMILY HISTORY:        Review of Systems:    General:  No wt loss, fevers, chills, night sweats,fatigue,   Eyes:  Good vision, no reported pain  ENT:  No sore throat, pain, runny nose, dysphagia  CV:  No pain, palpitatioins, hypo/hypertension  Resp:  No dyspnea, cough, tachypnea, wheezing  :  No pain, bleeding, incontinence, nocturia  Muscle:  No pain, weakness  Neuro:  No weakness, tingling, memory problems  Psych:  No fatigue, insomnia, mood problems, depression  Endocrine:  No polyuria, polydypsia, cold/heat intolerance  Heme:  No petechiae, ecchymosis, easy bruisability  Skin:  No rash, tattoos, scars, edema    Relevant Family History:       Relevant Social History:       Physical Exam:    Vital Signs:  Vital Signs Last 24 Hrs  T(C): 36 (2018 15:41), Max: 37.1 (2018 06:00)  T(F): 96.8 (2018 15:41), Max: 98.7 (2018 06:00)  HR: 90 (2018 16:59) (84 - 102)  BP: 117/72 (2018 16:59) (104/56 - 134/85)  BP(mean): --  RR: 15 (2018 16:59) (13 - 20)  SpO2: 99% (2018 16:59) (97% - 100%)  Daily Height in cm: 182.88 (2018 22:47)    Daily     General:  Appears stated age, well-groomed, well-nourished, no distress  HEENT:  NC/AT,  conjunctivae clear and pink, no thyromegaly, nodules, adenopathy, no JVD  Chest:  Full & symmetric excursion, no increased effort, breath sounds clear  Cardiovascular:  Regular rhythm, S1, S2, no murmur/rub/S3/S4, no abdominal bruit, no edema  Abdomen:  Soft, non-tender, non-distended, normoactive bowel sounds,  no masses ,no hepatosplenomeagaly, no signs of chronic liver disease  Extremities:  no cyanosis,clubbing or edema  Skin:  No rash/erythema/ecchymoses/petechiae/wounds/abscess/warm/dry  Neuro/Psych:  Alert, oriented, no asterixis, no tremor, no encephalopathy    Laboratory:                            14.9   11.26 )-----------( 326      ( 2018 14:54 )             46.1     07-13    140  |  103  |  6<L>  ----------------------------<  117<H>  4.5   |  30  |  1.10    Ca    8.6      2018 05:12    TPro  7.2  /  Alb  3.5  /  TBili  1.1  /  DBili  x   /  AST  31  /  ALT  46  /  AlkPhos  117  07-13    LIVER FUNCTIONS - ( 2018 05:12 )  Alb: 3.5 g/dL / Pro: 7.2 g/dL / ALK PHOS: 117 U/L / ALT: 46 U/L / AST: 31 U/L / GGT: x             Urinalysis Basic - ( 2018 18:06 )    Color: Yellow / Appearance: Clear / S.010 / pH: x  Gluc: x / Ketone: Negative  / Bili: Negative / Urobili: Negative   Blood: x / Protein: Negative / Nitrite: Negative   Leuk Esterase: Negative / RBC: x / WBC x   Sq Epi: x / Non Sq Epi: x / Bacteria: x        Imaging:

## 2018-07-13 NOTE — BRIEF OPERATIVE NOTE - PROCEDURE
<<-----Click on this checkbox to enter Procedure Laparoscopic cholecystectomy  07/13/2018    Active  MIKE

## 2018-07-14 LAB
ALBUMIN SERPL ELPH-MCNC: 3.2 G/DL — LOW (ref 3.3–5)
ALP SERPL-CCNC: 101 U/L — SIGNIFICANT CHANGE UP (ref 40–120)
ALT FLD-CCNC: 66 U/L — SIGNIFICANT CHANGE UP (ref 12–78)
ANION GAP SERPL CALC-SCNC: 8 MMOL/L — SIGNIFICANT CHANGE UP (ref 5–17)
AST SERPL-CCNC: 59 U/L — HIGH (ref 15–37)
BASOPHILS # BLD AUTO: 0.02 K/UL — SIGNIFICANT CHANGE UP (ref 0–0.2)
BASOPHILS NFR BLD AUTO: 0.1 % — SIGNIFICANT CHANGE UP (ref 0–2)
BILIRUB SERPL-MCNC: 0.8 MG/DL — SIGNIFICANT CHANGE UP (ref 0.2–1.2)
BUN SERPL-MCNC: 10 MG/DL — SIGNIFICANT CHANGE UP (ref 7–23)
CALCIUM SERPL-MCNC: 9.1 MG/DL — SIGNIFICANT CHANGE UP (ref 8.5–10.1)
CHLORIDE SERPL-SCNC: 102 MMOL/L — SIGNIFICANT CHANGE UP (ref 96–108)
CO2 SERPL-SCNC: 29 MMOL/L — SIGNIFICANT CHANGE UP (ref 22–31)
CREAT SERPL-MCNC: 1.1 MG/DL — SIGNIFICANT CHANGE UP (ref 0.5–1.3)
EOSINOPHIL # BLD AUTO: 0 K/UL — SIGNIFICANT CHANGE UP (ref 0–0.5)
EOSINOPHIL NFR BLD AUTO: 0 % — SIGNIFICANT CHANGE UP (ref 0–6)
GLUCOSE SERPL-MCNC: 178 MG/DL — HIGH (ref 70–99)
HCT VFR BLD CALC: 40.4 % — SIGNIFICANT CHANGE UP (ref 39–50)
HGB BLD-MCNC: 13.3 G/DL — SIGNIFICANT CHANGE UP (ref 13–17)
IMM GRANULOCYTES NFR BLD AUTO: 0.5 % — SIGNIFICANT CHANGE UP (ref 0–1.5)
LYMPHOCYTES # BLD AUTO: 0.64 K/UL — LOW (ref 1–3.3)
LYMPHOCYTES # BLD AUTO: 3.4 % — LOW (ref 13–44)
MCHC RBC-ENTMCNC: 28.9 PG — SIGNIFICANT CHANGE UP (ref 27–34)
MCHC RBC-ENTMCNC: 32.9 GM/DL — SIGNIFICANT CHANGE UP (ref 32–36)
MCV RBC AUTO: 87.8 FL — SIGNIFICANT CHANGE UP (ref 80–100)
MONOCYTES # BLD AUTO: 1.18 K/UL — HIGH (ref 0–0.9)
MONOCYTES NFR BLD AUTO: 6.3 % — SIGNIFICANT CHANGE UP (ref 2–14)
NEUTROPHILS # BLD AUTO: 16.87 K/UL — HIGH (ref 1.8–7.4)
NEUTROPHILS NFR BLD AUTO: 89.7 % — HIGH (ref 43–77)
PLATELET # BLD AUTO: 296 K/UL — SIGNIFICANT CHANGE UP (ref 150–400)
POTASSIUM SERPL-MCNC: 4.9 MMOL/L — SIGNIFICANT CHANGE UP (ref 3.5–5.3)
POTASSIUM SERPL-SCNC: 4.9 MMOL/L — SIGNIFICANT CHANGE UP (ref 3.5–5.3)
PROT SERPL-MCNC: 7.3 G/DL — SIGNIFICANT CHANGE UP (ref 6–8.3)
RBC # BLD: 4.6 M/UL — SIGNIFICANT CHANGE UP (ref 4.2–5.8)
RBC # FLD: 13.1 % — SIGNIFICANT CHANGE UP (ref 10.3–14.5)
SODIUM SERPL-SCNC: 139 MMOL/L — SIGNIFICANT CHANGE UP (ref 135–145)
WBC # BLD: 18.8 K/UL — HIGH (ref 3.8–10.5)
WBC # FLD AUTO: 18.8 K/UL — HIGH (ref 3.8–10.5)

## 2018-07-14 RX ORDER — SODIUM CHLORIDE 9 MG/ML
1000 INJECTION INTRAMUSCULAR; INTRAVENOUS; SUBCUTANEOUS
Qty: 0 | Refills: 0 | Status: DISCONTINUED | OUTPATIENT
Start: 2018-07-14 | End: 2018-07-16

## 2018-07-14 RX ADMIN — SODIUM CHLORIDE 75 MILLILITER(S): 9 INJECTION INTRAMUSCULAR; INTRAVENOUS; SUBCUTANEOUS at 23:00

## 2018-07-14 RX ADMIN — PIPERACILLIN AND TAZOBACTAM 25 GRAM(S): 4; .5 INJECTION, POWDER, LYOPHILIZED, FOR SOLUTION INTRAVENOUS at 14:12

## 2018-07-14 RX ADMIN — PIPERACILLIN AND TAZOBACTAM 25 GRAM(S): 4; .5 INJECTION, POWDER, LYOPHILIZED, FOR SOLUTION INTRAVENOUS at 01:02

## 2018-07-14 RX ADMIN — PIPERACILLIN AND TAZOBACTAM 25 GRAM(S): 4; .5 INJECTION, POWDER, LYOPHILIZED, FOR SOLUTION INTRAVENOUS at 06:08

## 2018-07-14 RX ADMIN — OXYCODONE AND ACETAMINOPHEN 2 TABLET(S): 5; 325 TABLET ORAL at 22:56

## 2018-07-14 RX ADMIN — OXYCODONE AND ACETAMINOPHEN 2 TABLET(S): 5; 325 TABLET ORAL at 23:56

## 2018-07-14 RX ADMIN — PIPERACILLIN AND TAZOBACTAM 25 GRAM(S): 4; .5 INJECTION, POWDER, LYOPHILIZED, FOR SOLUTION INTRAVENOUS at 22:52

## 2018-07-14 RX ADMIN — PANTOPRAZOLE SODIUM 40 MILLIGRAM(S): 20 TABLET, DELAYED RELEASE ORAL at 06:08

## 2018-07-14 NOTE — PROGRESS NOTE ADULT - SUBJECTIVE AND OBJECTIVE BOX
pt seen  c/o soreness  ambulating  tolerating diet  slight nausea last night  ICU Vital Signs Last 24 Hrs  T(C): 37.1 (14 Jul 2018 07:47), Max: 37.2 (13 Jul 2018 21:21)  T(F): 98.8 (14 Jul 2018 07:47), Max: 99 (13 Jul 2018 21:21)  HR: 77 (14 Jul 2018 07:47) (77 - 102)  BP: 119/74 (14 Jul 2018 07:47) (104/56 - 134/85)  BP(mean): --  ABP: --  ABP(mean): --  RR: 18 (14 Jul 2018 07:47) (13 - 18)  SpO2: 98% (14 Jul 2018 07:47) (96% - 100%)  gen-NAD  resp-clear  abd-soft, mild tenderness  incisions c/d/i  DAVID in place, serosanguinous drainage                          13.3   18.80 )-----------( 296      ( 14 Jul 2018 07:01 )             40.4   07-14    139  |  102  |  10  ----------------------------<  178<H>  4.9   |  29  |  1.10    Ca    9.1      14 Jul 2018 07:01    TPro  7.3  /  Alb  3.2<L>  /  TBili  0.8  /  DBili  x   /  AST  59<H>  /  ALT  66  /  AlkPhos  101  07-14      I&O's Detail    13 Jul 2018 07:01  -  14 Jul 2018 07:00  --------------------------------------------------------  IN:    lactated ringers.: 250 mL    Oral Fluid: 250 mL  Total IN: 500 mL    OUT:    Bulb: 53 mL    Voided: 2000 mL  Total OUT: 2053 mL    Total NET: -1553 mL

## 2018-07-14 NOTE — PROGRESS NOTE ADULT - SUBJECTIVE AND OBJECTIVE BOX
[facility]  Fredy Caballero PA-C  [address] [place] [address]  [phone]      INTERVAL HPI/OVERNIGHT EVENTS:    it hurts when i move     MEDICATIONS  (STANDING):  pantoprazole    Tablet 40 milliGRAM(s) Oral before breakfast  piperacillin/tazobactam IVPB. 3.375 Gram(s) IV Intermittent every 8 hours  sodium chloride 0.9%. 1000 milliLiter(s) (75 mL/Hr) IV Continuous <Continuous>    MEDICATIONS  (PRN):  diphenhydrAMINE   Injectable 25 milliGRAM(s) IV Push every 6 hours PRN itch and/.or insomnia  morphine  - Injectable 2 milliGRAM(s) IV Push every 6 hours PRN Moderate Pain (4 - 6)  morphine  - Injectable 2 milliGRAM(s) IV Push every 4 hours PRN Severe Pain (7 - 10)  ondansetron Injectable 4 milliGRAM(s) IV Push every 6 hours PRN Nausea and/or Vomiting  ondansetron Injectable 4 milliGRAM(s) IV Push every 6 hours PRN Nausea  oxyCODONE    5 mG/acetaminophen 325 mG 1 Tablet(s) Oral every 4 hours PRN Moderate Pain  oxyCODONE    5 mG/acetaminophen 325 mG 2 Tablet(s) Oral every 6 hours PRN Severe Pain      Allergies    No Known Allergies    Intolerances        ROS:   General:  No wt loss, fevers, chills, night sweats, fatigue,   Eyes:  Good vision, no reported pain  ENT:  No sore throat, pain, runny nose, dysphagia  CV:  No pain, palpitations, hypo/hypertension  Resp:  No dyspnea, cough, tachypnea, wheezing  GI:  No pain, No nausea, No vomiting, No diarrhea, No constipation, No weight loss, No fever, No pruritis, No rectal bleeding, No tarry stools, No dysphagia,  :  No pain, bleeding, incontinence, nocturia  Muscle:  No pain, weakness  Neuro:  No weakness, tingling, memory problems  Psych:  No fatigue, insomnia, mood problems, depression  Endocrine:  No polyuria, polydipsia, cold/heat intolerance  Heme:  No petechiae, ecchymosis, easy bruisability  Skin:  No rash, tattoos, scars, edema      PHYSICAL EXAM:   Vital Signs:  Vital Signs Last 24 Hrs  T(C): 37.1 (2018 07:47), Max: 37.2 (2018 21:21)  T(F): 98.8 (2018 07:47), Max: 99 (2018 21:21)  HR: 77 (2018 07:47) (77 - 102)  BP: 119/74 (2018 07:47) (104/56 - 134/85)  BP(mean): --  RR: 18 (2018 07:47) (13 - 18)  SpO2: 98% (2018 07:47) (96% - 100%)  Daily     Daily     GENERAL:  Appears stated age, well-groomed, well-nourished, no distress  HEENT:  NC/AT,  conjunctivae clear and pink, no thyromegaly, nodules, adenopathy, no JVD, sclera -anicteric  CHEST:  Full & symmetric excursion, no increased effort, breath sounds clear  HEART:  Regular rhythm, S1, S2, no murmur/rub/S3/S4, no abdominal bruit, no edema  ABDOMEN:  Soft, non-tender, non-distended, drain: serosang  EXTEREMITIES:  no cyanosis,clubbing or edema  SKIN:  No rash/erythema/ecchymoses/petechiae/wounds/abscess/warm/dry  NEURO:  Alert, oriented, no asterixis, no tremor, no encephalopathy      LABS:                        13.3   18.80 )-----------( 296      ( 2018 07:01 )             40.4     07-14    139  |  102  |  10  ----------------------------<  178<H>  4.9   |  29  |  1.10    Ca    9.1      2018 07:01    TPro  7.3  /  Alb  3.2<L>  /  TBili  0.8  /  DBili  x   /  AST  59<H>  /  ALT  66  /  AlkPhos  101  07-14      Urinalysis Basic - ( 2018 18:06 )    Color: Yellow / Appearance: Clear / S.010 / pH: x  Gluc: x / Ketone: Negative  / Bili: Negative / Urobili: Negative   Blood: x / Protein: Negative / Nitrite: Negative   Leuk Esterase: Negative / RBC: x / WBC x   Sq Epi: x / Non Sq Epi: x / Bacteria: x        RADIOLOGY & ADDITIONAL TESTS:

## 2018-07-14 NOTE — PROGRESS NOTE ADULT - SUBJECTIVE AND OBJECTIVE BOX
The patient was interviewed and evaluated  43y Male    Vital Signs Last 24 Hrs  T(C): 37.1 (14 Jul 2018 07:47), Max: 37.2 (13 Jul 2018 21:21)  T(F): 98.8 (14 Jul 2018 07:47), Max: 99 (13 Jul 2018 21:21)  HR: 77 (14 Jul 2018 07:47) (77 - 102)  BP: 119/74 (14 Jul 2018 07:47) (104/56 - 134/85)  BP(mean): --  RR: 18 (14 Jul 2018 07:47) (13 - 18)  SpO2: 98% (14 Jul 2018 07:47) (96% - 100%)    Pt seen, doing well, no anesthesia complications or complaints noted or reported.   No Nausea    No additional recommendations.     Pain well controlled

## 2018-07-15 LAB
ALBUMIN SERPL ELPH-MCNC: 3.3 G/DL — SIGNIFICANT CHANGE UP (ref 3.3–5)
ALP SERPL-CCNC: 92 U/L — SIGNIFICANT CHANGE UP (ref 40–120)
ALT FLD-CCNC: 74 U/L — SIGNIFICANT CHANGE UP (ref 12–78)
ANION GAP SERPL CALC-SCNC: 8 MMOL/L — SIGNIFICANT CHANGE UP (ref 5–17)
AST SERPL-CCNC: 49 U/L — HIGH (ref 15–37)
BILIRUB SERPL-MCNC: 0.5 MG/DL — SIGNIFICANT CHANGE UP (ref 0.2–1.2)
BUN SERPL-MCNC: 11 MG/DL — SIGNIFICANT CHANGE UP (ref 7–23)
CALCIUM SERPL-MCNC: 9 MG/DL — SIGNIFICANT CHANGE UP (ref 8.5–10.1)
CHLORIDE SERPL-SCNC: 102 MMOL/L — SIGNIFICANT CHANGE UP (ref 96–108)
CO2 SERPL-SCNC: 31 MMOL/L — SIGNIFICANT CHANGE UP (ref 22–31)
CREAT SERPL-MCNC: 1.1 MG/DL — SIGNIFICANT CHANGE UP (ref 0.5–1.3)
GLUCOSE SERPL-MCNC: 117 MG/DL — HIGH (ref 70–99)
HCT VFR BLD CALC: 40.8 % — SIGNIFICANT CHANGE UP (ref 39–50)
HGB BLD-MCNC: 13.3 G/DL — SIGNIFICANT CHANGE UP (ref 13–17)
MCHC RBC-ENTMCNC: 29.3 PG — SIGNIFICANT CHANGE UP (ref 27–34)
MCHC RBC-ENTMCNC: 32.6 GM/DL — SIGNIFICANT CHANGE UP (ref 32–36)
MCV RBC AUTO: 89.9 FL — SIGNIFICANT CHANGE UP (ref 80–100)
NRBC # BLD: 0 /100 WBCS — SIGNIFICANT CHANGE UP (ref 0–0)
PLATELET # BLD AUTO: 337 K/UL — SIGNIFICANT CHANGE UP (ref 150–400)
POTASSIUM SERPL-MCNC: 4.1 MMOL/L — SIGNIFICANT CHANGE UP (ref 3.5–5.3)
POTASSIUM SERPL-SCNC: 4.1 MMOL/L — SIGNIFICANT CHANGE UP (ref 3.5–5.3)
PROT SERPL-MCNC: 7.4 G/DL — SIGNIFICANT CHANGE UP (ref 6–8.3)
RBC # BLD: 4.54 M/UL — SIGNIFICANT CHANGE UP (ref 4.2–5.8)
RBC # FLD: 13.3 % — SIGNIFICANT CHANGE UP (ref 10.3–14.5)
SODIUM SERPL-SCNC: 141 MMOL/L — SIGNIFICANT CHANGE UP (ref 135–145)
WBC # BLD: 18.39 K/UL — HIGH (ref 3.8–10.5)
WBC # FLD AUTO: 18.39 K/UL — HIGH (ref 3.8–10.5)

## 2018-07-15 RX ADMIN — OXYCODONE AND ACETAMINOPHEN 2 TABLET(S): 5; 325 TABLET ORAL at 09:48

## 2018-07-15 RX ADMIN — PIPERACILLIN AND TAZOBACTAM 25 GRAM(S): 4; .5 INJECTION, POWDER, LYOPHILIZED, FOR SOLUTION INTRAVENOUS at 05:17

## 2018-07-15 RX ADMIN — OXYCODONE AND ACETAMINOPHEN 1 TABLET(S): 5; 325 TABLET ORAL at 16:00

## 2018-07-15 RX ADMIN — PANTOPRAZOLE SODIUM 40 MILLIGRAM(S): 20 TABLET, DELAYED RELEASE ORAL at 05:17

## 2018-07-15 RX ADMIN — PIPERACILLIN AND TAZOBACTAM 25 GRAM(S): 4; .5 INJECTION, POWDER, LYOPHILIZED, FOR SOLUTION INTRAVENOUS at 21:47

## 2018-07-15 RX ADMIN — SODIUM CHLORIDE 75 MILLILITER(S): 9 INJECTION INTRAMUSCULAR; INTRAVENOUS; SUBCUTANEOUS at 11:39

## 2018-07-15 RX ADMIN — PIPERACILLIN AND TAZOBACTAM 25 GRAM(S): 4; .5 INJECTION, POWDER, LYOPHILIZED, FOR SOLUTION INTRAVENOUS at 13:35

## 2018-07-15 RX ADMIN — OXYCODONE AND ACETAMINOPHEN 1 TABLET(S): 5; 325 TABLET ORAL at 15:00

## 2018-07-15 RX ADMIN — OXYCODONE AND ACETAMINOPHEN 2 TABLET(S): 5; 325 TABLET ORAL at 08:48

## 2018-07-15 NOTE — PROGRESS NOTE ADULT - SUBJECTIVE AND OBJECTIVE BOX
[facility]  Fredy Caballero PA-C  [address] [place] [address]  [phone]      INTERVAL HPI/OVERNIGHT EVENTS:    no new events    MEDICATIONS  (STANDING):  pantoprazole    Tablet 40 milliGRAM(s) Oral before breakfast  piperacillin/tazobactam IVPB. 3.375 Gram(s) IV Intermittent every 8 hours  sodium chloride 0.9%. 1000 milliLiter(s) (75 mL/Hr) IV Continuous <Continuous>    MEDICATIONS  (PRN):  diphenhydrAMINE   Injectable 25 milliGRAM(s) IV Push every 6 hours PRN itch and/.or insomnia  morphine  - Injectable 2 milliGRAM(s) IV Push every 6 hours PRN Moderate Pain (4 - 6)  morphine  - Injectable 2 milliGRAM(s) IV Push every 4 hours PRN Severe Pain (7 - 10)  ondansetron Injectable 4 milliGRAM(s) IV Push every 6 hours PRN Nausea and/or Vomiting  ondansetron Injectable 4 milliGRAM(s) IV Push every 6 hours PRN Nausea  oxyCODONE    5 mG/acetaminophen 325 mG 1 Tablet(s) Oral every 4 hours PRN Moderate Pain  oxyCODONE    5 mG/acetaminophen 325 mG 2 Tablet(s) Oral every 6 hours PRN Severe Pain      Allergies    No Known Allergies    Intolerances        ROS:   General:  No wt loss, fevers, chills, night sweats, fatigue,   Eyes:  Good vision, no reported pain  ENT:  No sore throat, pain, runny nose, dysphagia  CV:  No pain, palpitations, hypo/hypertension  Resp:  No dyspnea, cough, tachypnea, wheezing  GI:  No pain, No nausea, No vomiting, No diarrhea, No constipation, No weight loss, No fever, No pruritis, No rectal bleeding, No tarry stools, No dysphagia,  :  No pain, bleeding, incontinence, nocturia  Muscle:  No pain, weakness  Neuro:  No weakness, tingling, memory problems  Psych:  No fatigue, insomnia, mood problems, depression  Endocrine:  No polyuria, polydipsia, cold/heat intolerance  Heme:  No petechiae, ecchymosis, easy bruisability  Skin:  No rash, tattoos, scars, edema      PHYSICAL EXAM:   Vital Signs:  Vital Signs Last 24 Hrs  T(C): 37.1 (2018 07:47), Max: 37.2 (2018 21:21)  T(F): 98.8 (2018 07:47), Max: 99 (2018 21:21)  HR: 77 (2018 07:47) (77 - 102)  BP: 119/74 (2018 07:47) (104/56 - 134/85)  BP(mean): --  RR: 18 (2018 07:47) (13 - 18)  SpO2: 98% (2018 07:47) (96% - 100%)  Daily     Daily     GENERAL:  Appears stated age, well-groomed, well-nourished, no distress  HEENT:  NC/AT,  conjunctivae clear and pink, no thyromegaly, nodules, adenopathy, no JVD, sclera -anicteric  CHEST:  Full & symmetric excursion, no increased effort, breath sounds clear  HEART:  Regular rhythm, S1, S2, no murmur/rub/S3/S4, no abdominal bruit, no edema  ABDOMEN:  Soft, non-tender, non-distended, drain: serosang  EXTEREMITIES:  no cyanosis,clubbing or edema  SKIN:  No rash/erythema/ecchymoses/petechiae/wounds/abscess/warm/dry  NEURO:  Alert, oriented, no asterixis, no tremor, no encephalopathy      LABS:                        13.3   18.80 )-----------( 296      ( 2018 07:01 )             40.4     07-14    139  |  102  |  10  ----------------------------<  178<H>  4.9   |  29  |  1.10    Ca    9.1      2018 07:01    TPro  7.3  /  Alb  3.2<L>  /  TBili  0.8  /  DBili  x   /  AST  59<H>  /  ALT  66  /  AlkPhos  101  07-14      Urinalysis Basic - ( 2018 18:06 )    Color: Yellow / Appearance: Clear / S.010 / pH: x  Gluc: x / Ketone: Negative  / Bili: Negative / Urobili: Negative   Blood: x / Protein: Negative / Nitrite: Negative   Leuk Esterase: Negative / RBC: x / WBC x   Sq Epi: x / Non Sq Epi: x / Bacteria: x        RADIOLOGY & ADDITIONAL TESTS:

## 2018-07-15 NOTE — PROGRESS NOTE ADULT - SUBJECTIVE AND OBJECTIVE BOX
pt seen  c/o some nausea with abx  -vomiting  +F+BM  ambulating  ICU Vital Signs Last 24 Hrs  T(C): 36.7 (15 Jul 2018 08:29), Max: 37.2 (14 Jul 2018 16:50)  T(F): 98.1 (15 Jul 2018 08:29), Max: 98.9 (14 Jul 2018 16:50)  HR: 64 (15 Jul 2018 08:29) (64 - 79)  BP: 110/73 (15 Jul 2018 08:29) (107/72 - 110/73)  BP(mean): --  ABP: --  ABP(mean): --  RR: 17 (15 Jul 2018 08:29) (16 - 17)  SpO2: 99% (15 Jul 2018 08:29) (96% - 99%)  gen-NAD  resp-clear b/l  cvs-reg rate and rhythm  abd-soft NT/ND, incision c/d/i  DAVID serosanguinous drainage                            13.3   18.39 )-----------( 337      ( 15 Jul 2018 07:49 )             40.8   07-15    141  |  102  |  11  ----------------------------<  117<H>  4.1   |  31  |  1.10    Ca    9.0      15 Jul 2018 07:49    TPro  7.4  /  Alb  3.3  /  TBili  0.5  /  DBili  x   /  AST  49<H>  /  ALT  74  /  AlkPhos  92  07-15

## 2018-07-16 LAB
INR BLD: 1.09 RATIO — SIGNIFICANT CHANGE UP (ref 0.88–1.16)
PROTHROM AB SERPL-ACNC: 11.9 SEC — SIGNIFICANT CHANGE UP (ref 9.8–12.7)

## 2018-07-16 RX ORDER — SODIUM CHLORIDE 9 MG/ML
1000 INJECTION, SOLUTION INTRAVENOUS
Qty: 0 | Refills: 0 | Status: DISCONTINUED | OUTPATIENT
Start: 2018-07-16 | End: 2018-07-16

## 2018-07-16 RX ORDER — ONDANSETRON 8 MG/1
4 TABLET, FILM COATED ORAL ONCE
Qty: 0 | Refills: 0 | Status: DISCONTINUED | OUTPATIENT
Start: 2018-07-16 | End: 2018-07-16

## 2018-07-16 RX ORDER — OXYCODONE HYDROCHLORIDE 5 MG/1
5 TABLET ORAL EVERY 4 HOURS
Qty: 0 | Refills: 0 | Status: DISCONTINUED | OUTPATIENT
Start: 2018-07-16 | End: 2018-07-16

## 2018-07-16 RX ORDER — HYDROMORPHONE HYDROCHLORIDE 2 MG/ML
0.5 INJECTION INTRAMUSCULAR; INTRAVENOUS; SUBCUTANEOUS
Qty: 0 | Refills: 0 | Status: DISCONTINUED | OUTPATIENT
Start: 2018-07-16 | End: 2018-07-16

## 2018-07-16 RX ADMIN — PIPERACILLIN AND TAZOBACTAM 25 GRAM(S): 4; .5 INJECTION, POWDER, LYOPHILIZED, FOR SOLUTION INTRAVENOUS at 06:07

## 2018-07-16 RX ADMIN — SODIUM CHLORIDE 100 MILLILITER(S): 9 INJECTION, SOLUTION INTRAVENOUS at 15:38

## 2018-07-16 RX ADMIN — PANTOPRAZOLE SODIUM 40 MILLIGRAM(S): 20 TABLET, DELAYED RELEASE ORAL at 06:07

## 2018-07-16 RX ADMIN — SODIUM CHLORIDE 75 MILLILITER(S): 9 INJECTION INTRAMUSCULAR; INTRAVENOUS; SUBCUTANEOUS at 06:07

## 2018-07-16 NOTE — PROGRESS NOTE ADULT - SUBJECTIVE AND OBJECTIVE BOX
pt seen  c/o some pain  -nausea/vomiting  ICU Vital Signs Last 24 Hrs  T(C): 36.9 (16 Jul 2018 07:42), Max: 37.2 (15 Jul 2018 15:40)  T(F): 98.5 (16 Jul 2018 07:42), Max: 99 (15 Jul 2018 15:40)  HR: 60 (16 Jul 2018 07:42) (60 - 74)  BP: 113/74 (16 Jul 2018 07:42) (105/68 - 113/74)  BP(mean): --  ABP: --  ABP(mean): --  RR: 17 (16 Jul 2018 07:42) (16 - 17)  SpO2: 100% (16 Jul 2018 07:42) (99% - 100%)  gen-NAD  resp-clear b/l  abd-soft NT/ND, incision c/d/i      DAVID-minimal serosanguinous drainage

## 2018-07-17 VITALS
HEART RATE: 61 BPM | OXYGEN SATURATION: 97 % | RESPIRATION RATE: 17 BRPM | TEMPERATURE: 98 F | SYSTOLIC BLOOD PRESSURE: 94 MMHG | DIASTOLIC BLOOD PRESSURE: 56 MMHG

## 2018-07-17 LAB
ALBUMIN SERPL ELPH-MCNC: 3.6 G/DL — SIGNIFICANT CHANGE UP (ref 3.3–5)
ALP SERPL-CCNC: 87 U/L — SIGNIFICANT CHANGE UP (ref 40–120)
ALT FLD-CCNC: 86 U/L — HIGH (ref 12–78)
ANION GAP SERPL CALC-SCNC: 7 MMOL/L — SIGNIFICANT CHANGE UP (ref 5–17)
AST SERPL-CCNC: 32 U/L — SIGNIFICANT CHANGE UP (ref 15–37)
BILIRUB SERPL-MCNC: 0.3 MG/DL — SIGNIFICANT CHANGE UP (ref 0.2–1.2)
BUN SERPL-MCNC: 11 MG/DL — SIGNIFICANT CHANGE UP (ref 7–23)
CALCIUM SERPL-MCNC: 9.6 MG/DL — SIGNIFICANT CHANGE UP (ref 8.5–10.1)
CHLORIDE SERPL-SCNC: 102 MMOL/L — SIGNIFICANT CHANGE UP (ref 96–108)
CO2 SERPL-SCNC: 33 MMOL/L — HIGH (ref 22–31)
CREAT SERPL-MCNC: 1 MG/DL — SIGNIFICANT CHANGE UP (ref 0.5–1.3)
GLUCOSE SERPL-MCNC: 99 MG/DL — SIGNIFICANT CHANGE UP (ref 70–99)
HCT VFR BLD CALC: 41.4 % — SIGNIFICANT CHANGE UP (ref 39–50)
HGB BLD-MCNC: 13.8 G/DL — SIGNIFICANT CHANGE UP (ref 13–17)
MCHC RBC-ENTMCNC: 29.7 PG — SIGNIFICANT CHANGE UP (ref 27–34)
MCHC RBC-ENTMCNC: 33.3 GM/DL — SIGNIFICANT CHANGE UP (ref 32–36)
MCV RBC AUTO: 89 FL — SIGNIFICANT CHANGE UP (ref 80–100)
NRBC # BLD: 0 /100 WBCS — SIGNIFICANT CHANGE UP (ref 0–0)
PLATELET # BLD AUTO: 415 K/UL — HIGH (ref 150–400)
POTASSIUM SERPL-MCNC: 4.1 MMOL/L — SIGNIFICANT CHANGE UP (ref 3.5–5.3)
POTASSIUM SERPL-SCNC: 4.1 MMOL/L — SIGNIFICANT CHANGE UP (ref 3.5–5.3)
PROT SERPL-MCNC: 7.8 G/DL — SIGNIFICANT CHANGE UP (ref 6–8.3)
RBC # BLD: 4.65 M/UL — SIGNIFICANT CHANGE UP (ref 4.2–5.8)
RBC # FLD: 13.2 % — SIGNIFICANT CHANGE UP (ref 10.3–14.5)
SODIUM SERPL-SCNC: 142 MMOL/L — SIGNIFICANT CHANGE UP (ref 135–145)
WBC # BLD: 15.56 K/UL — HIGH (ref 3.8–10.5)
WBC # FLD AUTO: 15.56 K/UL — HIGH (ref 3.8–10.5)

## 2018-07-17 PROCEDURE — 85027 COMPLETE CBC AUTOMATED: CPT

## 2018-07-17 PROCEDURE — 96365 THER/PROPH/DIAG IV INF INIT: CPT

## 2018-07-17 PROCEDURE — 96376 TX/PRO/DX INJ SAME DRUG ADON: CPT

## 2018-07-17 PROCEDURE — 83690 ASSAY OF LIPASE: CPT

## 2018-07-17 PROCEDURE — 81003 URINALYSIS AUTO W/O SCOPE: CPT

## 2018-07-17 PROCEDURE — 86850 RBC ANTIBODY SCREEN: CPT

## 2018-07-17 PROCEDURE — 74176 CT ABD & PELVIS W/O CONTRAST: CPT

## 2018-07-17 PROCEDURE — 76705 ECHO EXAM OF ABDOMEN: CPT

## 2018-07-17 PROCEDURE — 88304 TISSUE EXAM BY PATHOLOGIST: CPT

## 2018-07-17 PROCEDURE — 86901 BLOOD TYPING SEROLOGIC RH(D): CPT

## 2018-07-17 PROCEDURE — C1769: CPT

## 2018-07-17 PROCEDURE — 99285 EMERGENCY DEPT VISIT HI MDM: CPT | Mod: 25

## 2018-07-17 PROCEDURE — C2625: CPT

## 2018-07-17 PROCEDURE — C1889: CPT

## 2018-07-17 PROCEDURE — 85610 PROTHROMBIN TIME: CPT

## 2018-07-17 PROCEDURE — 76001: CPT

## 2018-07-17 PROCEDURE — 36415 COLL VENOUS BLD VENIPUNCTURE: CPT

## 2018-07-17 PROCEDURE — 80053 COMPREHEN METABOLIC PANEL: CPT

## 2018-07-17 PROCEDURE — 87086 URINE CULTURE/COLONY COUNT: CPT

## 2018-07-17 PROCEDURE — 86900 BLOOD TYPING SEROLOGIC ABO: CPT

## 2018-07-17 RX ORDER — CLARITHROMYCIN 500 MG
1 TABLET ORAL
Qty: 0 | Refills: 0 | COMMUNITY

## 2018-07-17 RX ORDER — AMOXICILLIN 250 MG/5ML
1 SUSPENSION, RECONSTITUTED, ORAL (ML) ORAL
Qty: 0 | Refills: 0 | COMMUNITY

## 2018-07-17 NOTE — PROGRESS NOTE ADULT - SUBJECTIVE AND OBJECTIVE BOX
"Adult Mental Health Outpatient Group Therapy Progress Note     Date: 11/29/17  Time: 10:00-10:50    Client Initial Individualized Goals for Treatment:\"Positive structure.  Concerned about backsliding.  I want to maintain where I am now because I am doing much better.  Continue to move forward.  Work towards finding a part time job- figure out what makes sense as first steps and what type of job to pursue first.\".      Treatment Goals from Individualized Treatment Plan:   1) Safety: Client will notify staff when needing assistance to develop or implement a coping plan to manage suicidal or self injurious urges.  Client will use coping plan for safety, as needed.  2) Symptom Management: Bernadine will process life stressors and identify ways to work through and problem solve current issues as they come up.  3) Life Skills: In life skills Bernadine will learn, explore, practice and apply 2-3 skills/strategies that promote mindfulness and help her establish routine and life balance specifically in area of finding employment. Report on progress weekly.   4) Wellness/D.c planning: Will improve wellness related behaviors by setting wellness goals weekly. Reporting on progress weekly      Area of Treatment Focus:  Symptom Management, Personal Safety, Community Resources/Discharge Planning, Develop / Improve Independent Living Skills and Develop Socialization / Interpersonal Relationship Skills    Therapeutic Interventions/Treatment Strategies:  Support, Redirection, Feedback, Limit/Boundaries, Safety Assessments, Structured Activity, Problem Solving, Clarification and Education    Response to Treatment Strategies:  Gave Feedback, Listened, Attentive and Alert    Name of Group:  Self support skills    Description and Outcome: Bernadine attended and participated in a structured self-support skills group where intervention focuses on learning, developing, and practicing coping and life skills and strategies through structured experiential " psycho-education to improve function in valued roles, routines, relationships, and independent living skills.     Today the focus of the session is on developing self-regulation skills through structured activity. Group members were provided guidance and assistance in a chosen focused activity to gain self-awareness around what they find calming, grounding, or cognitively mindful. Bernadine is reflective on her preferences for reading, [employer], and exercising. Bernadine engages in milieu conversation and sharing, providing support and ideas easily to her peers. She is particularly welcoming to a new group member. She demonstrated understanding of session content and purpose by participating in all aspects of the structured and guided activity and making a plan for incorporating the activity into weekly schedule.      Treatment Planning Meetings:  11/03/17: Team met with Bernadine, progressing, See Treatment plan for details.  10/03/17: Team met with Bernadine, discussed progress. See Treatment Plan for details.   9/01/17: Team members met with Bernadine, discussed program, process, progress and set treatment goals with her. See Individualized treatment goals for details.   : Absent.  8/07/17: first day in program.    Is this a Weekly Review of the Progress on the Treatment Plan?  No..                                             pt seen  feeling good  c/o some pain  -n-v  ICU Vital Signs Last 24 Hrs  T(C): 36.8 (17 Jul 2018 07:11), Max: 37.2 (16 Jul 2018 18:05)  T(F): 98.3 (17 Jul 2018 07:11), Max: 98.9 (16 Jul 2018 18:05)  HR: 61 (17 Jul 2018 07:11) (56 - 93)  BP: 94/56 (17 Jul 2018 07:11) (94/56 - 126/76)  BP(mean): --  ABP: --  ABP(mean): --  RR: 17 (17 Jul 2018 07:11) (13 - 17)  SpO2: 97% (17 Jul 2018 07:11) (95% - 100%)  gen-NAD  resp-clear b/l  cvs-reg rate and rhythm  abd-soft NT/ND, draining serosanguinous

## 2018-07-17 NOTE — DISCHARGE NOTE ADULT - PATIENT PORTAL LINK FT
You can access the [facility] Patient Portal, offered by [facility], by registering with the following website: [email or web address]

## 2018-07-17 NOTE — DISCHARGE NOTE ADULT - HOSPITAL COURSE
pt admitted with acute cholecystitis. Underwent lap luann.  PT had possibly slight defect in cystic duct where drain was left in area. No drainage seen and pt slowly recovered.  ERCP was done where  cbd stone removed and stent placed.  No leak noticed. D/C home POD#4

## 2018-07-17 NOTE — PROGRESS NOTE ADULT - SUBJECTIVE AND OBJECTIVE BOX
INTERVAL HPI/OVERNIGHT EVENTS:  pt seen and examined  denies n/v, c/o mild post op pain, reports +bm +gas  tolerating diet  no new labs afebrile overnight  tez drain with 20cc output yesterday    MEDICATIONS  (STANDING):    MEDICATIONS  (PRN):      Allergies    No Known Allergies    Intolerances        Review of Systems:    General:  No wt loss, fevers, chills, night sweats, fatigue   Eyes:  Good vision, no reported pain  ENT:  No sore throat, pain, runny nose, dysphagia  CV:  No pain, palpitations, hypo/hypertension  Resp:  No dyspnea, cough, tachypnea, wheezing  GI:  +post op pain, No nausea, No vomiting, No diarrhea, No constipation, No weight loss, No fever, No pruritis, No rectal bleeding, No melena, No dysphagia  :  No pain, bleeding, incontinence, nocturia  Muscle:  No pain, weakness  Neuro:  No weakness, tingling, memory problems  Psych:  No fatigue, insomnia, mood problems, depression  Endocrine:  No polyuria, polydypsia, cold/heat intolerance  Heme:  No petechiae, ecchymosis, easy bruisability  Skin:  No rash, tattoos, scars, edema      Vital Signs Last 24 Hrs  T(C): 36.8 (17 Jul 2018 07:11), Max: 37.2 (16 Jul 2018 18:05)  T(F): 98.3 (17 Jul 2018 07:11), Max: 98.9 (16 Jul 2018 18:05)  HR: 61 (17 Jul 2018 07:11) (56 - 93)  BP: 94/56 (17 Jul 2018 07:11) (94/56 - 126/76)  BP(mean): --  RR: 17 (17 Jul 2018 07:11) (13 - 18)  SpO2: 97% (17 Jul 2018 07:11) (95% - 100%)    PHYSICAL EXAM:    Constitutional: NAD, lying in bed  HEENT: EOMI, perrl  Neck: No LAD  Respiratory: CTA   Cardiovascular: S1 and S2, RRR  Gastrointestinal: soft, mild post op incisional tenderness, ND, incision sites c/d/i  Extremities: No peripheral edema  Vascular: 2+ peripheral pulses  Neurological: A/O x 3  Skin: No rashes      LABS:          PT/INR - ( 16 Jul 2018 06:44 )   PT: 11.9 sec;   INR: 1.09 ratio               RADIOLOGY & ADDITIONAL TESTS:

## 2018-07-17 NOTE — DISCHARGE NOTE ADULT - CARE PROVIDER_API CALL
Messi Whitaker (MD), Surgery  [address] [facility]  [address] [place] [address]  Phone: [phone]  Fax: [phone]

## 2018-07-17 NOTE — PROGRESS NOTE ADULT - PROBLEM SELECTOR PLAN 1
sp ercp 7/16, stent placed  f/u surgery recs  monitor tez output, minimal at present   diet as tolerated  prn pain control  will need close outpatient gi f/u on dc for eventual stent removal

## 2018-07-17 NOTE — DISCHARGE NOTE ADULT - MEDICATION SUMMARY - MEDICATIONS TO TAKE
I will START or STAY ON the medications listed below when I get home from the hospital:    oxyCODONE-acetaminophen 5 mg-325 mg oral tablet  -- 1 tab(s) by mouth every 6 hours, As Needed MDD:6   -- Caution federal law prohibits the transfer of this drug to any person other  than the person for whom it was prescribed.  May cause drowsiness.  Alcohol may intensify this effect.  Use care when operating dangerous machinery.  This prescription cannot be refilled.  This product contains acetaminophen.  Do not use  with any other product containing acetaminophen to prevent possible liver damage.  Using more of this medication than prescribed may cause serious breathing problems.    -- Indication: For Pain control    omeprazole 20 mg oral delayed release capsule  -- 1 cap(s) by mouth once a day  -- Indication: For Home med

## 2018-07-17 NOTE — DISCHARGE NOTE ADULT - CARE PLAN
Principal Discharge DX:	Acute cholecystitis  Goal:	recover  Assessment and plan of treatment:	drain care  pain meds as needed  may shower  f/u 1 week

## 2018-09-01 PROBLEM — K80.50 CALCULUS OF BILE DUCT WITHOUT CHOLANGITIS OR CHOLECYSTITIS WITHOUT OBSTRUCTION: Chronic | Status: ACTIVE | Noted: 2018-07-12

## 2018-09-04 ENCOUNTER — OUTPATIENT (OUTPATIENT)
Dept: OUTPATIENT SERVICES | Facility: HOSPITAL | Age: 43
LOS: 1 days | End: 2018-09-04

## 2018-09-04 VITALS
DIASTOLIC BLOOD PRESSURE: 86 MMHG | HEART RATE: 84 BPM | SYSTOLIC BLOOD PRESSURE: 120 MMHG | WEIGHT: 195.11 LBS | RESPIRATION RATE: 16 BRPM | TEMPERATURE: 98 F | OXYGEN SATURATION: 99 % | HEIGHT: 71 IN

## 2018-09-04 DIAGNOSIS — Z90.49 ACQUIRED ABSENCE OF OTHER SPECIFIED PARTS OF DIGESTIVE TRACT: Chronic | ICD-10-CM

## 2018-09-04 DIAGNOSIS — K83.0 CHOLANGITIS: ICD-10-CM

## 2018-09-04 DIAGNOSIS — Z98.890 OTHER SPECIFIED POSTPROCEDURAL STATES: Chronic | ICD-10-CM

## 2018-09-04 DIAGNOSIS — T85.9XXA UNSPECIFIED COMPLICATION OF INTERNAL PROSTHETIC DEVICE, IMPLANT AND GRAFT, INITIAL ENCOUNTER: Chronic | ICD-10-CM

## 2018-09-04 LAB
ALBUMIN SERPL ELPH-MCNC: 4.6 G/DL — SIGNIFICANT CHANGE UP (ref 3.3–5)
ALP SERPL-CCNC: 86 U/L — SIGNIFICANT CHANGE UP (ref 40–120)
ALT FLD-CCNC: 22 U/L — SIGNIFICANT CHANGE UP (ref 4–41)
AST SERPL-CCNC: 18 U/L — SIGNIFICANT CHANGE UP (ref 4–40)
BILIRUB SERPL-MCNC: 0.4 MG/DL — SIGNIFICANT CHANGE UP (ref 0.2–1.2)
BUN SERPL-MCNC: 7 MG/DL — SIGNIFICANT CHANGE UP (ref 7–23)
CALCIUM SERPL-MCNC: 9.3 MG/DL — SIGNIFICANT CHANGE UP (ref 8.4–10.5)
CHLORIDE SERPL-SCNC: 100 MMOL/L — SIGNIFICANT CHANGE UP (ref 98–107)
CO2 SERPL-SCNC: 26 MMOL/L — SIGNIFICANT CHANGE UP (ref 22–31)
CREAT SERPL-MCNC: 1.05 MG/DL — SIGNIFICANT CHANGE UP (ref 0.5–1.3)
GLUCOSE SERPL-MCNC: 81 MG/DL — SIGNIFICANT CHANGE UP (ref 70–99)
HCT VFR BLD CALC: 43.4 % — SIGNIFICANT CHANGE UP (ref 39–50)
HGB BLD-MCNC: 14 G/DL — SIGNIFICANT CHANGE UP (ref 13–17)
MCHC RBC-ENTMCNC: 28.7 PG — SIGNIFICANT CHANGE UP (ref 27–34)
MCHC RBC-ENTMCNC: 32.3 % — SIGNIFICANT CHANGE UP (ref 32–36)
MCV RBC AUTO: 89.1 FL — SIGNIFICANT CHANGE UP (ref 80–100)
NRBC # FLD: 0 — SIGNIFICANT CHANGE UP
PLATELET # BLD AUTO: 314 K/UL — SIGNIFICANT CHANGE UP (ref 150–400)
PMV BLD: 11 FL — SIGNIFICANT CHANGE UP (ref 7–13)
POTASSIUM SERPL-MCNC: 3.5 MMOL/L — SIGNIFICANT CHANGE UP (ref 3.5–5.3)
POTASSIUM SERPL-SCNC: 3.5 MMOL/L — SIGNIFICANT CHANGE UP (ref 3.5–5.3)
PROT SERPL-MCNC: 7.4 G/DL — SIGNIFICANT CHANGE UP (ref 6–8.3)
RBC # BLD: 4.87 M/UL — SIGNIFICANT CHANGE UP (ref 4.2–5.8)
RBC # FLD: 13.1 % — SIGNIFICANT CHANGE UP (ref 10.3–14.5)
SODIUM SERPL-SCNC: 140 MMOL/L — SIGNIFICANT CHANGE UP (ref 135–145)
WBC # BLD: 8.85 K/UL — SIGNIFICANT CHANGE UP (ref 3.8–10.5)
WBC # FLD AUTO: 8.85 K/UL — SIGNIFICANT CHANGE UP (ref 3.8–10.5)

## 2018-09-04 RX ORDER — OMEPRAZOLE 10 MG/1
1 CAPSULE, DELAYED RELEASE ORAL
Qty: 0 | Refills: 0 | COMMUNITY

## 2018-09-04 NOTE — H&P PST ADULT - NEGATIVE CARDIOVASCULAR SYMPTOMS
no paroxysmal nocturnal dyspnea/no dyspnea on exertion/no palpitations/no peripheral edema/no chest pain

## 2018-09-04 NOTE — H&P PST ADULT - NEGATIVE NEUROLOGICAL SYMPTOMS
no headache/no weakness/no generalized seizures/no tremors/no syncope/no difficulty walking/no paresthesias/no vertigo/no loss of sensation

## 2018-09-04 NOTE — H&P PST ADULT - NEGATIVE MUSCULOSKELETAL SYMPTOMS
no back pain/no muscle weakness/no arthritis/no arthralgia/no stiffness/no neck pain/no myalgia/no muscle cramps

## 2018-09-04 NOTE — H&P PST ADULT - HISTORY OF PRESENT ILLNESS
43 year old male with hx of cholangitis and pancreatitis earlier this year, initially treated with biliary duct stent and later underwent laparoscopic cholecystectomy in 7/2018. Pt denies abdominal pain, nausea, vomiting, or weight loss. 43 year old male with hx of cholangitis and pancreatitis earlier this year, initially treated with biliary duct stent and later underwent laparoscopic cholecystectomy in 7/2018. Pt denies abdominal pain, nausea, vomiting.  Pt is scheduled for endoscopic retrograde cholangiopancreatography, stent removal for 9/18/18.

## 2018-09-04 NOTE — H&P PST ADULT - ANESTHESIA, PREVIOUS REACTION, PROFILE
"first time woke up too early, second time, difficult to wake up". Denies family Hx of malignant hyperthermia

## 2018-09-04 NOTE — H&P PST ADULT - PMH
Asthma  childhood, does not have an inhaler  Cholangitis    Choledocholithiasis    GERD (gastroesophageal reflux disease)    H pylori ulcer  treated in 2018  Pancreatitis, gallstone  2018, resolved

## 2018-09-04 NOTE — H&P PST ADULT - NEGATIVE OPHTHALMOLOGIC SYMPTOMS
no pain R/no loss of vision L/no loss of vision R/no pain L/no diplopia/no blurred vision L/no photophobia/no blurred vision R

## 2018-09-04 NOTE — H&P PST ADULT - PROBLEM SELECTOR PLAN 1
Pt is scheduled for endoscopic retrograde cholangiopancreatography, stent removal for 9/18/18. Pre-op instructions provided. Pepcid provided for GI prophylaxis. Chlorhexidine soap provided for skin prep. Pt stated understanding

## 2018-09-04 NOTE — H&P PST ADULT - PSH
H/O endoscopic retrograde cholangiopancreatography  4/2018  History of laparoscopic cholecystectomy  7/2018 and placement of bile duct stent

## 2018-09-04 NOTE — H&P PST ADULT - NSANTHOSAYNRD_GEN_A_CORE
No. TRAE screening performed.  STOP BANG Legend: 0-2 = LOW Risk; 3-4 = INTERMEDIATE Risk; 5-8 = HIGH Risk

## 2018-09-04 NOTE — H&P PST ADULT - NEGATIVE ENMT SYMPTOMS
no nose bleeds/no dysphagia/no hearing difficulty/no vertigo/no sinus symptoms/no ear pain/no throat pain

## 2018-09-04 NOTE — H&P PST ADULT - FAMILY HISTORY
Father  Still living? Yes, Estimated age: Age Unknown  Family history of appendicitis, Age at diagnosis: Age Unknown

## 2018-09-05 PROBLEM — J45.909 UNSPECIFIED ASTHMA, UNCOMPLICATED: Chronic | Status: ACTIVE | Noted: 2018-03-29

## 2018-09-05 PROBLEM — K85.10 BILIARY ACUTE PANCREATITIS WITHOUT NECROSIS OR INFECTION: Chronic | Status: ACTIVE | Noted: 2018-07-12

## 2018-09-05 PROBLEM — K27.9 PEPTIC ULCER, SITE UNSPECIFIED, UNSPECIFIED AS ACUTE OR CHRONIC, WITHOUT HEMORRHAGE OR PERFORATION: Chronic | Status: ACTIVE | Noted: 2018-07-12

## 2018-09-18 ENCOUNTER — OUTPATIENT (OUTPATIENT)
Dept: OUTPATIENT SERVICES | Facility: HOSPITAL | Age: 43
LOS: 1 days | Discharge: HOME | End: 2018-09-18
Payer: MEDICAID

## 2018-09-18 DIAGNOSIS — Z90.49 ACQUIRED ABSENCE OF OTHER SPECIFIED PARTS OF DIGESTIVE TRACT: Chronic | ICD-10-CM

## 2018-09-18 DIAGNOSIS — Z98.890 OTHER SPECIFIED POSTPROCEDURAL STATES: Chronic | ICD-10-CM

## 2018-09-18 DIAGNOSIS — K83.0 CHOLANGITIS: ICD-10-CM

## 2018-09-18 PROCEDURE — 88305 TISSUE EXAM BY PATHOLOGIST: CPT | Mod: 26

## 2018-09-18 PROCEDURE — 88312 SPECIAL STAINS GROUP 1: CPT | Mod: 26

## 2018-09-18 PROCEDURE — 88112 CYTOPATH CELL ENHANCE TECH: CPT | Mod: 26

## 2018-09-20 LAB — SURGICAL PATHOLOGY STUDY: SIGNIFICANT CHANGE UP

## 2018-09-24 LAB — NON-GYNECOLOGICAL CYTOLOGY STUDY: SIGNIFICANT CHANGE UP

## 2019-02-05 NOTE — ED ADULT NURSE NOTE - CAS DISCH CONDITION
Sophia Montemayor M.D.  COLONOSCOPY INSTRUCTIONS     NAME: [facility]  HOSPITAL: [facility]     DATE: 3/4/19 Monday  CHECK IN AT: 4th FLOOR GI LAB       ARRIVAL TIME: 7:45 am       · A sedative will be given to you for the exam. A responsible adult 18 or over must accompany    you  from the hospital the day of your exam.  · You may not leave by yourself or drive yourself home. You may not drive for the rest    of the day or return to work.  · IF YOU DO NOT HAVE A  THAT IS A RESPONSIBLE ADULT THAT IS 18 YEARS OF AGE OR OLDER, YOUR APPOINTMENT WILL BE CANCELLED.  · You may take Tylenol  (acetaminophen). Do not take aspirin day of procedure.  · If you are diabetic,See special instructions handout.  · If you take blood thinners (Coumadin, Warfarin, Plavix), see special instructions handout.  · YOU NEED TO CALL YOUR INSURANCE TO VERIFY COVERAGE FOR THIS PROCEDURE.    WHAT YOU NEED TO DO:  · Obtain a SUPREP BOWEL PREP KIT from the pharmacy. You will need a prescription.    DAY BEFORE EXAMINATION: ON 3/3/19, Sunday   · Do not eat any solid food all day.  · You many drink clear liquids, such as soda, clear fruit juice, coffee or tea-without cream,    beef or chicken broth,Popsicles, Jell-O, Propel, Gatore Cindi, and hard candy. Avoid anything with RED coloring. We encourage you to drink a lot of fluids for a couple of days prior to procedure.  · You may drink clear liquids or suck on hard candy or Popsicles while drinking the Prep.  · Some people feel full or sick during the Prep. This disappears with time, especially    when you start passing stool.     STEP 1: At 5:00pm Pour ONE (1) 6- oz bottle of SUPREP liquid into the 16 oz. mixing container.  Add 10 oz. of cool drinking water to the 16- oz fill line on the container and mix.              Drink ALL the liquid in the 16 oz. Container.          STEP 2: You MUST drink two (2) more 16- oz containers of water over the next 1hour.              STEP 3: At 9:00pm Pour the  other 6 - oz bottle of SUPREP liquid into the 16 oz.   mixing container.  Add 10 oz. of cool drinking water to the 16- oz fill line on the container and mix.    Drink ALL the liquid in the 16 oz. Container.    STEP 4: You MUST drink two (2) more 16- oz containers of water over the next 1 hour.    DAY OF EXAMINATION:  · Do not eat or drink anything after midnight the night before your exam.  · Take your regular blood pressure and heart medications on the morning of the test with a sip  of water.  · IF YOU HAVE ANY QUESTIONS REGARDING THESE INSTRUCTIONS CALL [phone].   Stable

## 2020-03-26 NOTE — ED PROVIDER NOTE - DISCUSSED CLINICAL AND RADIOLOGICAL FINDINGS WITH, MDM
Spironolactone Pregnancy And Lactation Text: This medication can cause feminization of the male fetus and should be avoided during pregnancy. The active metabolite is also found in breast milk. Dapsone Pregnancy And Lactation Text: This medication is Pregnancy Category C and is not considered safe during pregnancy or breast feeding. Minocycline Pregnancy And Lactation Text: This medication is Pregnancy Category D and not consider safe during pregnancy. It is also excreted in breast milk. family/patient Azithromycin Counseling:  I discussed with the patient the risks of azithromycin including but not limited to GI upset, allergic reaction, drug rash, diarrhea, and yeast infections. Minocycline Counseling: Patient advised regarding possible photosensitivity and discoloration of the teeth, skin, lips, tongue and gums.  Patient instructed to avoid sunlight, if possible.  When exposed to sunlight, patients should wear protective clothing, sunglasses, and sunscreen.  The patient was instructed to call the office immediately if the following severe adverse effects occur:  hearing changes, easy bruising/bleeding, severe headache, or vision changes.  The patient verbalized understanding of the proper use and possible adverse effects of minocycline.  All of the patient's questions and concerns were addressed. Topical Retinoid counseling:  Patient advised to apply a pea-sized amount only at bedtime and wait 30 minutes after washing their face before applying.  If too drying, patient may add a non-comedogenic moisturizer. The patient verbalized understanding of the proper use and possible adverse effects of retinoids.  All of the patient's questions and concerns were addressed. Benzoyl Peroxide Pregnancy And Lactation Text: This medication is Pregnancy Category C. It is unknown if benzoyl peroxide is excreted in breast milk. Tazorac Pregnancy And Lactation Text: This medication is not safe during pregnancy. It is unknown if this medication is excreted in breast milk. Topical Clindamycin Counseling: Patient counseled that this medication may cause skin irritation or allergic reactions.  In the event of skin irritation, the patient was advised to reduce the amount of the drug applied or use it less frequently.   The patient verbalized understanding of the proper use and possible adverse effects of clindamycin.  All of the patient's questions and concerns were addressed. Use Enhanced Medication Counseling?: No Birth Control Pills Pregnancy And Lactation Text: This medication should be avoided if pregnant and for the first 30 days post-partum. Erythromycin Pregnancy And Lactation Text: This medication is Pregnancy Category B and is considered safe during pregnancy. It is also excreted in breast milk. Topical Clindamycin Pregnancy And Lactation Text: This medication is Pregnancy Category B and is considered safe during pregnancy. It is unknown if it is excreted in breast milk. Topical Sulfur Applications Pregnancy And Lactation Text: This medication is Pregnancy Category C and has an unknown safety profile during pregnancy. It is unknown if this topical medication is excreted in breast milk. Tazorac Counseling:  Patient advised that medication is irritating and drying.  Patient may need to apply sparingly and wash off after an hour before eventually leaving it on overnight.  The patient verbalized understanding of the proper use and possible adverse effects of tazorac.  All of the patient's questions and concerns were addressed. Isotretinoin Pregnancy And Lactation Text: This medication is Pregnancy Category X and is considered extremely dangerous during pregnancy. It is unknown if it is excreted in breast milk. Bactrim Pregnancy And Lactation Text: This medication is Pregnancy Category D and is known to cause fetal risk.  It is also excreted in breast milk. Dapsone Counseling: I discussed with the patient the risks of dapsone including but not limited to hemolytic anemia, agranulocytosis, rashes, methemoglobinemia, kidney failure, peripheral neuropathy, headaches, GI upset, and liver toxicity.  Patients who start dapsone require monitoring including baseline LFTs and weekly CBCs for the first month, then every month thereafter.  The patient verbalized understanding of the proper use and possible adverse effects of dapsone.  All of the patient's questions and concerns were addressed. Doxycycline Counseling:  Patient counseled regarding possible photosensitivity and increased risk for sunburn.  Patient instructed to avoid sunlight, if possible.  When exposed to sunlight, patients should wear protective clothing, sunglasses, and sunscreen.  The patient was instructed to call the office immediately if the following severe adverse effects occur:  hearing changes, easy bruising/bleeding, severe headache, or vision changes.  The patient verbalized understanding of the proper use and possible adverse effects of doxycycline.  All of the patient's questions and concerns were addressed. Spironolactone Counseling: Patient advised regarding risks of diarrhea, abdominal pain, hyperkalemia, birth defects (for female patients), liver toxicity and renal toxicity. The patient may need blood work to monitor liver and kidney function and potassium levels while on therapy. The patient verbalized understanding of the proper use and possible adverse effects of spironolactone.  All of the patient's questions and concerns were addressed. Doxycycline Pregnancy And Lactation Text: This medication is Pregnancy Category D and not consider safe during pregnancy. It is also excreted in breast milk but is considered safe for shorter treatment courses. Azithromycin Pregnancy And Lactation Text: This medication is considered safe during pregnancy and is also secreted in breast milk. High Dose Vitamin A Counseling: Side effects reviewed, pt to contact office should one occur. Isotretinoin Counseling: Patient should get monthly blood tests, not donate blood, not drive at night if vision affected, not share medication, and not undergo elective surgery for 6 months after tx completed. Side effects reviewed, pt to contact office should one occur. Bactrim Counseling:  I discussed with the patient the risks of sulfa antibiotics including but not limited to GI upset, allergic reaction, drug rash, diarrhea, dizziness, photosensitivity, and yeast infections.  Rarely, more serious reactions can occur including but not limited to aplastic anemia, agranulocytosis, methemoglobinemia, blood dyscrasias, liver or kidney failure, lung infiltrates or desquamative/blistering drug rashes. Benzoyl Peroxide Counseling: Patient counseled that medicine may cause skin irritation and bleach clothing.  In the event of skin irritation, the patient was advised to reduce the amount of the drug applied or use it less frequently.   The patient verbalized understanding of the proper use and possible adverse effects of benzoyl peroxide.  All of the patient's questions and concerns were addressed. Topical Retinoid Pregnancy And Lactation Text: This medication is Pregnancy Category C. It is unknown if this medication is excreted in breast milk. Erythromycin Counseling:  I discussed with the patient the risks of erythromycin including but not limited to GI upset, allergic reaction, drug rash, diarrhea, increase in liver enzymes, and yeast infections. Topical Sulfur Applications Counseling: Topical Sulfur Counseling: Patient counseled that this medication may cause skin irritation or allergic reactions.  In the event of skin irritation, the patient was advised to reduce the amount of the drug applied or use it less frequently.   The patient verbalized understanding of the proper use and possible adverse effects of topical sulfur application.  All of the patient's questions and concerns were addressed. High Dose Vitamin A Pregnancy And Lactation Text: High dose vitamin A therapy is contraindicated during pregnancy and breast feeding. Birth Control Pills Counseling: Birth Control Pill Counseling: I discussed with the patient the potential side effects of OCPs including but not limited to increased risk of stroke, heart attack, thrombophlebitis, deep venous thrombosis, hepatic adenomas, breast changes, GI upset, headaches, and depression.  The patient verbalized understanding of the proper use and possible adverse effects of OCPs. All of the patient's questions and concerns were addressed. Detail Level: Simple Tetracycline Counseling: Patient counseled regarding possible photosensitivity and increased risk for sunburn.  Patient instructed to avoid sunlight, if possible.  When exposed to sunlight, patients should wear protective clothing, sunglasses, and sunscreen.  The patient was instructed to call the office immediately if the following severe adverse effects occur:  hearing changes, easy bruising/bleeding, severe headache, or vision changes.  The patient verbalized understanding of the proper use and possible adverse effects of tetracycline.  All of the patient's questions and concerns were addressed. Patient understands to avoid pregnancy while on therapy due to potential birth defects.

## 2020-08-31 NOTE — H&P PST ADULT - NS PRO AD PATIENT TYPE
----- Message from Iron Mason sent at 8/31/2020 12:32 PM EDT -----  Regarding: Results   Contact: [phone]  Pt is calling regarding results from procedure done on 8/24.   
----- Message from Jameson Darby MD sent at 8/27/2020  1:21 PM EDT -----  Stricture sigmoid colon, await OV with general surgery sept 3  
Called pt and advised of the note from Dr Darby. She verb understanding.   
Health Care Proxy (HCP)

## 2020-10-04 ENCOUNTER — EMERGENCY (EMERGENCY)
Facility: HOSPITAL | Age: 45
LOS: 1 days | Discharge: HOME | End: 2020-10-04
Attending: EMERGENCY MEDICINE | Admitting: EMERGENCY MEDICINE
Payer: MEDICAID

## 2020-10-04 VITALS
RESPIRATION RATE: 20 BRPM | SYSTOLIC BLOOD PRESSURE: 142 MMHG | HEIGHT: 71 IN | DIASTOLIC BLOOD PRESSURE: 73 MMHG | HEART RATE: 74 BPM | TEMPERATURE: 98 F | OXYGEN SATURATION: 99 % | WEIGHT: 184.97 LBS

## 2020-10-04 DIAGNOSIS — Z90.49 ACQUIRED ABSENCE OF OTHER SPECIFIED PARTS OF DIGESTIVE TRACT: Chronic | ICD-10-CM

## 2020-10-04 DIAGNOSIS — Z98.890 OTHER SPECIFIED POSTPROCEDURAL STATES: Chronic | ICD-10-CM

## 2020-10-04 PROBLEM — K83.0 CHOLANGITIS: Chronic | Status: ACTIVE | Noted: 2018-09-04

## 2020-10-04 LAB
ALBUMIN SERPL ELPH-MCNC: 4.2 G/DL — SIGNIFICANT CHANGE UP (ref 3.3–5)
ALP SERPL-CCNC: 92 U/L — SIGNIFICANT CHANGE UP (ref 40–120)
ALT FLD-CCNC: 22 U/L — SIGNIFICANT CHANGE UP (ref 12–78)
ANION GAP SERPL CALC-SCNC: 8 MMOL/L — SIGNIFICANT CHANGE UP (ref 5–17)
APPEARANCE UR: CLEAR — SIGNIFICANT CHANGE UP
APTT BLD: 29.8 SEC — SIGNIFICANT CHANGE UP (ref 27.5–35.5)
AST SERPL-CCNC: 19 U/L — SIGNIFICANT CHANGE UP (ref 15–37)
BASOPHILS # BLD AUTO: 0.07 K/UL — SIGNIFICANT CHANGE UP (ref 0–0.2)
BASOPHILS NFR BLD AUTO: 0.8 % — SIGNIFICANT CHANGE UP (ref 0–2)
BILIRUB SERPL-MCNC: 0.6 MG/DL — SIGNIFICANT CHANGE UP (ref 0.2–1.2)
BILIRUB UR-MCNC: NEGATIVE — SIGNIFICANT CHANGE UP
BUN SERPL-MCNC: 9 MG/DL — SIGNIFICANT CHANGE UP (ref 7–23)
CALCIUM SERPL-MCNC: 9 MG/DL — SIGNIFICANT CHANGE UP (ref 8.5–10.1)
CHLORIDE SERPL-SCNC: 105 MMOL/L — SIGNIFICANT CHANGE UP (ref 96–108)
CO2 SERPL-SCNC: 26 MMOL/L — SIGNIFICANT CHANGE UP (ref 22–31)
COLOR SPEC: YELLOW — SIGNIFICANT CHANGE UP
CREAT SERPL-MCNC: 1.4 MG/DL — HIGH (ref 0.5–1.3)
DIFF PNL FLD: ABNORMAL
EOSINOPHIL # BLD AUTO: 0.07 K/UL — SIGNIFICANT CHANGE UP (ref 0–0.5)
EOSINOPHIL NFR BLD AUTO: 0.8 % — SIGNIFICANT CHANGE UP (ref 0–6)
GLUCOSE SERPL-MCNC: 151 MG/DL — HIGH (ref 70–99)
GLUCOSE UR QL: NEGATIVE — SIGNIFICANT CHANGE UP
HCT VFR BLD CALC: 44.6 % — SIGNIFICANT CHANGE UP (ref 39–50)
HGB BLD-MCNC: 14.6 G/DL — SIGNIFICANT CHANGE UP (ref 13–17)
IMM GRANULOCYTES NFR BLD AUTO: 0.4 % — SIGNIFICANT CHANGE UP (ref 0–1.5)
INR BLD: 1.03 RATIO — SIGNIFICANT CHANGE UP (ref 0.88–1.16)
KETONES UR-MCNC: NEGATIVE — SIGNIFICANT CHANGE UP
LEUKOCYTE ESTERASE UR-ACNC: NEGATIVE — SIGNIFICANT CHANGE UP
LIDOCAIN IGE QN: 165 U/L — SIGNIFICANT CHANGE UP (ref 73–393)
LYMPHOCYTES # BLD AUTO: 0.9 K/UL — LOW (ref 1–3.3)
LYMPHOCYTES # BLD AUTO: 10.1 % — LOW (ref 13–44)
MCHC RBC-ENTMCNC: 29 PG — SIGNIFICANT CHANGE UP (ref 27–34)
MCHC RBC-ENTMCNC: 32.7 GM/DL — SIGNIFICANT CHANGE UP (ref 32–36)
MCV RBC AUTO: 88.5 FL — SIGNIFICANT CHANGE UP (ref 80–100)
MONOCYTES # BLD AUTO: 0.39 K/UL — SIGNIFICANT CHANGE UP (ref 0–0.9)
MONOCYTES NFR BLD AUTO: 4.4 % — SIGNIFICANT CHANGE UP (ref 2–14)
NEUTROPHILS # BLD AUTO: 7.42 K/UL — HIGH (ref 1.8–7.4)
NEUTROPHILS NFR BLD AUTO: 83.5 % — HIGH (ref 43–77)
NITRITE UR-MCNC: NEGATIVE — SIGNIFICANT CHANGE UP
NRBC # BLD: 0 /100 WBCS — SIGNIFICANT CHANGE UP (ref 0–0)
PH UR: 5 — SIGNIFICANT CHANGE UP (ref 5–8)
PLATELET # BLD AUTO: 313 K/UL — SIGNIFICANT CHANGE UP (ref 150–400)
POTASSIUM SERPL-MCNC: 4 MMOL/L — SIGNIFICANT CHANGE UP (ref 3.5–5.3)
POTASSIUM SERPL-SCNC: 4 MMOL/L — SIGNIFICANT CHANGE UP (ref 3.5–5.3)
PROT SERPL-MCNC: 7.9 G/DL — SIGNIFICANT CHANGE UP (ref 6–8.3)
PROT UR-MCNC: NEGATIVE — SIGNIFICANT CHANGE UP
PROTHROM AB SERPL-ACNC: 12 SEC — SIGNIFICANT CHANGE UP (ref 10.6–13.6)
RBC # BLD: 5.04 M/UL — SIGNIFICANT CHANGE UP (ref 4.2–5.8)
RBC # FLD: 13.2 % — SIGNIFICANT CHANGE UP (ref 10.3–14.5)
SODIUM SERPL-SCNC: 139 MMOL/L — SIGNIFICANT CHANGE UP (ref 135–145)
SP GR SPEC: 1.02 — SIGNIFICANT CHANGE UP (ref 1.01–1.02)
UROBILINOGEN FLD QL: NEGATIVE — SIGNIFICANT CHANGE UP
WBC # BLD: 8.89 K/UL — SIGNIFICANT CHANGE UP (ref 3.8–10.5)
WBC # FLD AUTO: 8.89 K/UL — SIGNIFICANT CHANGE UP (ref 3.8–10.5)

## 2020-10-04 PROCEDURE — 99284 EMERGENCY DEPT VISIT MOD MDM: CPT | Mod: 25

## 2020-10-04 PROCEDURE — 85730 THROMBOPLASTIN TIME PARTIAL: CPT

## 2020-10-04 PROCEDURE — 36415 COLL VENOUS BLD VENIPUNCTURE: CPT

## 2020-10-04 PROCEDURE — 99283 EMERGENCY DEPT VISIT LOW MDM: CPT

## 2020-10-04 PROCEDURE — 96361 HYDRATE IV INFUSION ADD-ON: CPT

## 2020-10-04 PROCEDURE — 87086 URINE CULTURE/COLONY COUNT: CPT

## 2020-10-04 PROCEDURE — 83690 ASSAY OF LIPASE: CPT

## 2020-10-04 PROCEDURE — 80053 COMPREHEN METABOLIC PANEL: CPT

## 2020-10-04 PROCEDURE — 85610 PROTHROMBIN TIME: CPT

## 2020-10-04 PROCEDURE — 81001 URINALYSIS AUTO W/SCOPE: CPT

## 2020-10-04 PROCEDURE — 85025 COMPLETE CBC W/AUTO DIFF WBC: CPT

## 2020-10-04 PROCEDURE — 96374 THER/PROPH/DIAG INJ IV PUSH: CPT

## 2020-10-04 PROCEDURE — 74176 CT ABD & PELVIS W/O CONTRAST: CPT | Mod: 26

## 2020-10-04 PROCEDURE — 96375 TX/PRO/DX INJ NEW DRUG ADDON: CPT

## 2020-10-04 PROCEDURE — 74176 CT ABD & PELVIS W/O CONTRAST: CPT

## 2020-10-04 RX ORDER — KETOROLAC TROMETHAMINE 30 MG/ML
30 SYRINGE (ML) INJECTION ONCE
Refills: 0 | Status: DISCONTINUED | OUTPATIENT
Start: 2020-10-04 | End: 2020-10-04

## 2020-10-04 RX ORDER — ONDANSETRON 8 MG/1
4 TABLET, FILM COATED ORAL ONCE
Refills: 0 | Status: COMPLETED | OUTPATIENT
Start: 2020-10-04 | End: 2020-10-04

## 2020-10-04 RX ORDER — SODIUM CHLORIDE 9 MG/ML
1000 INJECTION INTRAMUSCULAR; INTRAVENOUS; SUBCUTANEOUS ONCE
Refills: 0 | Status: COMPLETED | OUTPATIENT
Start: 2020-10-04 | End: 2020-10-04

## 2020-10-04 RX ORDER — TAMSULOSIN HYDROCHLORIDE 0.4 MG/1
1 CAPSULE ORAL
Qty: 10 | Refills: 0
Start: 2020-10-04 | End: 2020-10-13

## 2020-10-04 RX ORDER — TAMSULOSIN HYDROCHLORIDE 0.4 MG/1
0.4 CAPSULE ORAL ONCE
Refills: 0 | Status: COMPLETED | OUTPATIENT
Start: 2020-10-04 | End: 2020-10-04

## 2020-10-04 RX ADMIN — SODIUM CHLORIDE 1000 MILLILITER(S): 9 INJECTION INTRAMUSCULAR; INTRAVENOUS; SUBCUTANEOUS at 12:44

## 2020-10-04 RX ADMIN — TAMSULOSIN HYDROCHLORIDE 0.4 MILLIGRAM(S): 0.4 CAPSULE ORAL at 14:32

## 2020-10-04 RX ADMIN — SODIUM CHLORIDE 1000 MILLILITER(S): 9 INJECTION INTRAMUSCULAR; INTRAVENOUS; SUBCUTANEOUS at 13:45

## 2020-10-04 RX ADMIN — ONDANSETRON 4 MILLIGRAM(S): 8 TABLET, FILM COATED ORAL at 12:44

## 2020-10-04 RX ADMIN — Medication 30 MILLIGRAM(S): at 12:44

## 2020-10-04 NOTE — ED PROVIDER NOTE - PATIENT PORTAL LINK FT
You can access the FollowMyHealth Patient Portal offered by [facility] by registering at the following website: [email or web address]. By joining [facility]’s FollowMyHealth portal, you will also be able to view your health information using other applications (apps) compatible with our system.

## 2020-10-04 NOTE — ED PROVIDER NOTE - CARE PROVIDER_API CALL
Arcadio Morley  UROLOGY  [address], [address]  [place] [address]  Phone: [phone]  Fax: [phone]  Follow Up Time: Urgent

## 2020-10-04 NOTE — ED PROVIDER NOTE - ATTENDING CONTRIBUTION TO CARE
46 yo male who has hx of cholecystitis pancreatitis presents with sudden severe rlq pain that started in r flank he thought he had an acute appy so he came to er for eval the pain was 10/10 until he received iv toradol and now is pain free.   on eval:  wd wn male nad sitting on edge of bed  heent cor chest normal  abd rlq discomfort r cvat tender no guard no rebound  ext normal skin normal  neuro normal  plan ro stone treat pain ro appy iv labs re eval

## 2020-10-04 NOTE — ED PROVIDER NOTE - OBJECTIVE STATEMENT
44 y/o M with PMH asthma, Cholangitis, Choledocholithiasis, GERD,  H pylori ulcer, Pancreatitis, gallstone s/p luann presents to ED for c/o R flank pain. States at 4 am, getting worse. Pain is sharp and intermittently worse at times. Also associated with nausea and urinary urgency. Denies fever chills diarrhea. Reports constipation. Last BM yesterday was normal. No cough chest pain abd pain.     PCP Dr otto

## 2020-10-04 NOTE — ED ADULT NURSE NOTE - OBJECTIVE STATEMENT
Right flank pain, difficult urination & bowel movement, afebrile, bladder scan =75-100ml, will continue to monitor

## 2020-10-04 NOTE — ED PROVIDER NOTE - CLINICAL SUMMARY MEDICAL DECISION MAKING FREE TEXT BOX
46 y/o m with R flank pain since 0400, also with nasuea an durinary urgency, likely ernal colic plan= labs urine and CT pain control

## 2020-10-04 NOTE — ED PROVIDER NOTE - NSFOLLOWUPINSTRUCTIONS_ED_ALL_ED_FT
Kidney Stones    Kidney stones (urolithiasis) are crystal deposits that form inside your kidneys. Pain is caused by the stone moving through the urinary tract, causing spasms of the ureter. Drink enough water and fluids to keep your urine clear or pale yellow. This will help you to pass the stone or stone fragments. If provided a strainer, strain all urine and keep all particulate matter and stones for a follow up appointment with a urologist.    SEEK IMMEDIATE MEDICAL CARE IF YOU HAVE ANY OF THE FOLLOWING SYMPTOMS: pain not controlled with medication, fever/chills, worsening vomiting, inability to urinate, or dizziness/lightheadedness.      1. TAKE ALL MEDICATIONS AS DIRECTED.  FOR PAIN YOU CAN TAKE IBUPROFEN (MOTRIN, ADVIL) OR ACETAMINOPHEN (TYLENOL) AS NEEDED, AS DIRECTED ON PACKAGING.  2. FOLLOW UP WITH __UROLOGY________ AS DIRECTED.  YOU WERE GIVEN COPIES OF ALL LABS AND IMAGING RESULTS FROM YOUR ER VISIT--PLEASE TAKE THEM WITH YOU TO YOUR APPOINTMENT.  3.RETURN TO THE ER FOR ANY NEW OR WORSENING SYMPTOMS.

## 2020-10-04 NOTE — ED PROVIDER NOTE - INTERNATIONAL TRAVEL
CC:  Chief Complaint   Patient presents with   • Annual Exam       HISTORY OF PRESENT ILLNESS: Patient is a 69 y.o. male established patient presenting ***      Hypogonadism in male  Pt still doing very well with testosterone gel.     Polycythemia  Pt continues to donate blood every 2 months. His last hgb before getting drawn was 17.5.    Mixed hyperlipidemia  Pt has been very intolerant to statins. Notably lipitor was the worst and Crestor was not much better. He has taken fish oil supplements. Niacin made his skin turn red. He once had a coronary artery calcium scan done with excellent results.     CHERYL on CPAP  Pt still using CPAP every night with good effect. Recently had it adjusted.     Other male erectile dysfunction  Pt considering Garcia Wave procedure for his ED. Both cialis and viagra give him headaches.       Patient Active Problem List    Diagnosis Date Noted   • Polycythemia 06/08/2020   • Hypogonadism in male 04/07/2020   • Other male erectile dysfunction 04/07/2020   • Mixed hyperlipidemia 04/07/2020   • CHERYL on CPAP 04/07/2020      Allergies:Statins [hmg-coa-r inhibitors] and Onion    Current Outpatient Medications   Medication Sig Dispense Refill   • tadalafil (CIALIS) 20 MG tablet Take 20 mg by mouth as needed for Erectile Dysfunction.     • triamcinolone acetonide (KENALOG) 0.1 % Ointment Apply  to affected area(s) as needed.     • testosterone (TESTIM/ANDROGEL) 50 MG/5GM (1%) Gel gel Apply 50 mg to skin as directed every day for 180 days. 1500 mg 5     No current facility-administered medications for this visit.        Social History     Tobacco Use   • Smoking status: Never Smoker   • Smokeless tobacco: Never Used   Substance Use Topics   • Alcohol use: Yes     Frequency: 4 or more times a week     Drinks per session: 1 or 2     Comment: wine and beer    • Drug use: Never     Social History     Social History Narrative   • Not on file       Family History   Problem Relation Age of Onset   •  "Stroke Mother    • No Known Problems Sister         ROS:     - Constitutional: *** Negative for fever, chills, unexpected weight change, and fatigue/generalized weakness.    - HEENT: *** Negative for headaches, vision changes, hearing changes, ear pain, ear discharge, rhinorrhea, sinus congestion, sore throat, and neck pain.      - Respiratory:*** Negative for cough, sputum production, chest congestion, dyspnea, wheezing, and crackles.      - Cardiovascular:*** Negative for chest pain, palpitations, orthopnea, and bilateral lower extremity edema.     - Gastrointestinal:*** Negative for heartburn, nausea, vomiting, abdominal pain, hematochezia, melena, diarrhea, constipation, and greasy/foul-smelling stools.     - Genitourinary:*** Negative for dysuria, polyuria, hematuria, pyuria, urinary urgency, and urinary incontinence.     - Musculoskeletal:*** Negative for myalgias, back pain, and joint pain.     - Skin:*** Negative for rash, itching, cyanotic skin color change.     - Neurological:*** Negative for dizziness, tingling, tremors, focal sensory deficit, focal weakness and headaches.     - Endo/Heme/Allergies:*** Does not bruise/bleed easily.     - Psychiatric/Behavioral:*** Negative for depression, suicidal/homicidal ideation and memory loss.        {ROSALLOTHERSNE}      Exam:  ***  /76 (BP Location: Left arm, Patient Position: Sitting, BP Cuff Size: Adult)   Pulse 74   Temp 36.4 °C (97.6 °F) (Temporal)   Resp 16   Ht 1.683 m (5' 6.25\")   Wt 85.3 kg (188 lb)   SpO2 96%  Body mass index is 30.12 kg/m².    General:  Well nourished, well developed male in NAD  Head is grossly normal.  Neck: Supple. Thyroid is not enlarged.  Pulmonary: Clear to auscultation. No ronchi, wheezing or rales  Cardiac: Regular rate and rhythm. No murmurs.  Skin: Warm and dry. No obvious lesions  Neuro: Normal muscle tone. Gait normal. Alert and oriented.  Psych: Normal mood and affect    ***  Please note that this " dictation was created using voice recognition software. I have made every reasonable attempt to correct obvious errors, but I expect that there are errors of grammar and possibly content that I did not discover before finalizing the note.    LABS: ***: Results reviewed and discussed with the patient, questions answered.    Assessment/Plan:  1. Hypogonadism in male    2. Polycythemia    3. Mixed hyperlipidemia    4. CHERYL on CPAP    5. Other male erectile dysfunction    Other orders  - tadalafil (CIALIS) 20 MG tablet; Take 20 mg by mouth as needed for Erectile Dysfunction.           No

## 2020-10-05 LAB
CULTURE RESULTS: SIGNIFICANT CHANGE UP
SPECIMEN SOURCE: SIGNIFICANT CHANGE UP

## 2021-10-29 NOTE — H&P PST ADULT - NS NEC GEN PE MLT EXAM PC
Today you were seen for:  1. Type 2 diabetes mellitus without complication, without long-term current use of insulin (CMS/[facility])    2. Combined form of age-related cataract, both eyes    3. Suspected glaucoma of both eyes    4. Epiretinal membrane, right eye    5. Hyperopia of both eyes with astigmatism and presbyopia      Pataday: 1 drop one in each eye twice daily  Claratin and Fluticasone daily  Systane Ultra: one drop in each eye four times a day    You are recommended to:  No follow-ups on file.    Dr. Maggie Reyes  Phone: [phone]  Fax: [phone] [place] [address]    If you use the [employer] application to message Dr. Reyes you will reach her optometric technicians. They will try to address your problem, concern and/or question.    Patient Education     What Are Cataracts?    A clear lens in the eye focuses light. This lets the eye see images sharply. Aging is the most common cause of cataracts. With age, the lens slowly becomes cloudy. The cloudy lens is a cataract. A cataract scatters light and makes it hard for the eye to focus. Cataracts often form in both eyes. But one lens may cloud faster than the other.  The aging of your lens  Your lens may cloud so slowly that you don`t notice any vision changes at first. But as the cataract gets worse, the eye has a harder time focusing. In early stages, glasses may help you see better. As the lens gets more cloudy, your healthcare provider may recommend surgery to restore your vision.  A clear lens allows your eye to bring objects sharply into focus.  A mild cataract may slightly blur your vision.  A dense cataract can severely blur your vision.  Date Last Reviewed: 11/1/2017  © 3791-0345 [employer]. [address] [place] [address], [place] [address]. All rights reserved. This information is not intended as a substitute for professional medical care. Always follow your healthcare professional's instructions.          Patient Education     What Is Diabetic Retinopathy?  Diabetic retinopathy is the main cause of blindness in adults. It happens when diabetes harms blood vessels in the eye. These weak vessels leak fluid into a part of the eye called the retina. New blood vessels can break and bleed into the retina. Old blood vessels can leak and cause swelling. These vessels can damage parts of the retina. This can cause blurry, distorted vision.     What causes diabetic retinopathy?  Diabetes is the cause of this eye disease. Over time, diabetes weakens blood vessels all over the body, even in the eyes. Poor blood sugar control can make it worse. So can:  · Smoking  · High cholesterol  · High blood pressure  · Pregnancy  This health problem happens more often in Hispanics and in  Americans.   What are the symptoms?  You can have diabetic retinopathy without knowing it. There is often no pain and no outward sign. Over time, you may notice blurring or some vision loss. Some people have trouble seeing at night or see spots or floaters. Symptoms may come and go. Early treatment and good control of risk factors may help prevent vision loss or blindness.  What can you do?  Have your eyes checked at least once a year by an eye specialist. Your healthcare provider will tell you when and how often you need these exams. You can also help control your diabetes through:  · Exercise  · Diet  · Medicine, if needed  If you already have diabetic retinopathy, these same steps may help you control it, too. But don't do exercises that raise your blood pressure quickly.  Melissa last reviewed this educational content on 11/1/2019  © 8590-0858 [employer]. [address] [place] [address], [place] [address]. All rights reserved. This information is not intended as a substitute for professional medical care. Always follow your healthcare professional's instructions.         Patient Education     What Is Glaucoma?    Glaucoma is an eye  disease that can cause blindness. If caught early, it can usually be controlled. But it often has no symptoms, so you need regular eye exams. Glaucoma usually begins when pressure builds up in the eye. This pressure can damage the optic nerve. The optic nerve sends messages to the brain so you can see. There are two main kinds of glaucoma: open-angle and closed-angle.  Drainage area  The eye is always producing fluid. The eye's drainage areas may become clogged or blocked. Too much fluid stays in the eye. This increases eye pressure.  Optic nerve  Too much pressure in the eye can damage the optic nerve. If damaged, this nerve cannot send the messages to the brain that let you see.  Open-angle glaucoma  Open-angle is the most common kind of glaucoma. It happens slowly as people age. The drainage area in the eye becomes clogged. Not enough fluid drains from the eye, so pressure slowly builds up. This causes gradual loss of side (peripheral) vision. You may not even notice changes until much of your vision is lost.  Closed-angle glaucoma  Closed-angle glaucoma is less common than open-angle. It usually comes on quickly. The drainage area in the eye suddenly becomes completely blocked. Eye pressure builds rapidly. You may notice blurred vision and rainbow halos around lights. You may also have headaches, nausea, vomiting, and severe pain. If not treated right away, blindness can happen quickly.  Date Last Reviewed: 10/1/2017  © 4126-8437 [employer]. [address] [place] [address], [place] [address]. All rights reserved. This information is not intended as a substitute for professional medical care. Always follow your healthcare professional's instructions.            detailed exam

## 2023-11-09 NOTE — ED PROVIDER NOTE - CPE EDP PSYCH NORM
For information on Fall & Injury Prevention, visit: [email or web address] OR  [email or web address] OR  https://www.cdc.gov/steadi/patient.html normal...

## 2023-12-11 NOTE — PROGRESS NOTE ADULT - PROBLEM SELECTOR PROBLEM 4
Labs reviewed, will discuss with patient at next visit.
Need for prophylactic measure
Need for prophylactic measure

## 2024-04-04 NOTE — ED PROVIDER NOTE - NEURO NEGATIVE STATEMENT, MLM
The defib pads were placed on the patient's chest. no loss of consciousness, no gait abnormality, no headache, no sensory deficits, and no weakness.

## 2024-07-12 NOTE — DISCHARGE NOTE ADULT - IF YOU ARE A SMOKER, IT IS IMPORTANT FOR YOUR HEALTH TO STOP SMOKING. PLEASE BE AWARE THAT SECOND HAND SMOKE IS ALSO HARMFUL.
Shift assessment done,patient is awake,alert and oriented X 4.  She is on room air,has clear breath sounds bilaterally.  She is on a regular diet.  She is able to walk to the bathroom with no assistance.  She has a sitter at bedside and suicide precautions in place.   Statement Selected

## 2025-01-16 NOTE — ED ADULT NURSE NOTE - CADM POA CENTRAL LINE
MEDICATIONS  (PRN):  acetaminophen     Tablet .. 650 milliGRAM(s) Oral every 6 hours PRN Temp greater or equal to 38C (100.4F), Mild Pain (1 - 3)  albuterol    90 MICROgram(s) HFA Inhaler 1 Puff(s) Inhalation every 4 hours PRN Asthma  aluminum hydroxide/magnesium hydroxide/simethicone Suspension 30 milliLiter(s) Oral every 6 hours PRN Dyspepsia  haloperidol     Tablet 5 milliGRAM(s) Oral every 6 hours PRN Anxiety/Agitation  haloperidol    Injectable 5 milliGRAM(s) IntraMuscular once PRN Severe agitation  LORazepam     Tablet 2 milliGRAM(s) Oral every 6 hours PRN Anxiety/Agitation  LORazepam   Injectable 2 milliGRAM(s) IntraMuscular once PRN Severe agitation/anxiety  magnesium hydroxide Suspension 30 milliLiter(s) Oral daily PRN Constipation   MEDICATIONS  (PRN):  acetaminophen     Tablet .. 650 milliGRAM(s) Oral every 6 hours PRN Temp greater or equal to 38C (100.4F), Mild Pain (1 - 3)  albuterol    90 MICROgram(s) HFA Inhaler 1 Puff(s) Inhalation every 4 hours PRN Asthma  aluminum hydroxide/magnesium hydroxide/simethicone Suspension 30 milliLiter(s) Oral every 6 hours PRN Dyspepsia  haloperidol     Tablet 5 milliGRAM(s) Oral every 6 hours PRN Anxiety/Agitation  haloperidol    Injectable 5 milliGRAM(s) IntraMuscular once PRN Severe agitation  LORazepam     Tablet 2 milliGRAM(s) Oral every 6 hours PRN Anxiety/Agitation  LORazepam   Injectable 2 milliGRAM(s) IntraMuscular once PRN Severe agitation/anxiety  magnesium hydroxide Suspension 30 milliLiter(s) Oral daily PRN Constipation   No